# Patient Record
Sex: FEMALE | Race: BLACK OR AFRICAN AMERICAN | NOT HISPANIC OR LATINO | Employment: FULL TIME | RURAL
[De-identification: names, ages, dates, MRNs, and addresses within clinical notes are randomized per-mention and may not be internally consistent; named-entity substitution may affect disease eponyms.]

---

## 2018-01-29 ENCOUNTER — HISTORICAL (OUTPATIENT)
Dept: ADMINISTRATIVE | Facility: HOSPITAL | Age: 43
End: 2018-01-29

## 2018-02-01 LAB
LAB AP CLINICAL INFORMATION: NORMAL
LAB AP GENERAL CAT - HISTORICAL: NORMAL
LAB AP INTERPRETATION/RESULT - HISTORICAL: NEGATIVE
LAB AP SPECIMEN ADEQUACY - HISTORICAL: NORMAL
LAB AP SPECIMEN SUBMITTED - HISTORICAL: NORMAL

## 2020-02-27 ENCOUNTER — HISTORICAL (OUTPATIENT)
Dept: ADMINISTRATIVE | Facility: HOSPITAL | Age: 45
End: 2020-02-27

## 2021-03-12 VITALS
DIASTOLIC BLOOD PRESSURE: 93 MMHG | SYSTOLIC BLOOD PRESSURE: 138 MMHG | BODY MASS INDEX: 36.65 KG/M2 | RESPIRATION RATE: 20 BRPM | HEIGHT: 65 IN | TEMPERATURE: 98 F | WEIGHT: 220 LBS | HEART RATE: 86 BPM

## 2021-03-12 RX ORDER — AMLODIPINE BESYLATE 5 MG/1
5 TABLET ORAL DAILY
COMMUNITY
End: 2021-12-16 | Stop reason: SDUPTHER

## 2021-03-12 RX ORDER — VALACYCLOVIR HYDROCHLORIDE 500 MG/1
500 TABLET, FILM COATED ORAL 2 TIMES DAILY
COMMUNITY
End: 2021-12-20

## 2021-03-12 RX ORDER — ALBUTEROL SULFATE 90 UG/1
2 AEROSOL, METERED RESPIRATORY (INHALATION) EVERY 6 HOURS PRN
COMMUNITY
End: 2021-12-16 | Stop reason: SDUPTHER

## 2021-03-16 ENCOUNTER — HOSPITAL ENCOUNTER (OUTPATIENT)
Dept: RADIOLOGY | Facility: HOSPITAL | Age: 46
Discharge: HOME | End: 2021-03-16
Payer: COMMERCIAL

## 2021-03-16 VITALS — HEIGHT: 65 IN | WEIGHT: 220 LBS | BODY MASS INDEX: 36.65 KG/M2

## 2021-03-16 DIAGNOSIS — Z12.31 VISIT FOR SCREENING MAMMOGRAM: ICD-10-CM

## 2021-03-16 PROCEDURE — 77067 SCR MAMMO BI INCL CAD: CPT | Mod: TC

## 2021-03-17 ENCOUNTER — OFFICE VISIT (OUTPATIENT)
Dept: FAMILY MEDICINE | Facility: CLINIC | Age: 46
End: 2021-03-17
Payer: COMMERCIAL

## 2021-03-17 VITALS
DIASTOLIC BLOOD PRESSURE: 93 MMHG | BODY MASS INDEX: 36.29 KG/M2 | SYSTOLIC BLOOD PRESSURE: 142 MMHG | HEIGHT: 65 IN | TEMPERATURE: 98 F | WEIGHT: 217.81 LBS | OXYGEN SATURATION: 100 % | RESPIRATION RATE: 18 BRPM | HEART RATE: 65 BPM

## 2021-03-17 DIAGNOSIS — L02.91 ABSCESS: Primary | ICD-10-CM

## 2021-03-17 PROCEDURE — 99214 OFFICE O/P EST MOD 30 MIN: CPT | Mod: 25,,, | Performed by: FAMILY MEDICINE

## 2021-03-17 PROCEDURE — 96372 PR INJECTION,THERAP/PROPH/DIAG2ST, IM OR SUBCUT: ICD-10-PCS | Mod: ,,, | Performed by: FAMILY MEDICINE

## 2021-03-17 PROCEDURE — 99214 PR OFFICE/OUTPT VISIT, EST, LEVL IV, 30-39 MIN: ICD-10-PCS | Mod: 25,,, | Performed by: FAMILY MEDICINE

## 2021-03-17 PROCEDURE — 1126F AMNT PAIN NOTED NONE PRSNT: CPT | Mod: ,,, | Performed by: FAMILY MEDICINE

## 2021-03-17 PROCEDURE — 3008F BODY MASS INDEX DOCD: CPT | Mod: CPTII,,, | Performed by: FAMILY MEDICINE

## 2021-03-17 PROCEDURE — 3008F PR BODY MASS INDEX (BMI) DOCUMENTED: ICD-10-PCS | Mod: CPTII,,, | Performed by: FAMILY MEDICINE

## 2021-03-17 PROCEDURE — 1126F PR PAIN SEVERITY QUANTIFIED, NO PAIN PRESENT: ICD-10-PCS | Mod: ,,, | Performed by: FAMILY MEDICINE

## 2021-03-17 PROCEDURE — 96372 THER/PROPH/DIAG INJ SC/IM: CPT | Mod: ,,, | Performed by: FAMILY MEDICINE

## 2021-03-17 RX ORDER — CLINDAMYCIN PHOSPHATE 150 MG/ML
300 INJECTION, SOLUTION INTRAVENOUS
Status: COMPLETED | OUTPATIENT
Start: 2021-03-17 | End: 2021-03-17

## 2021-03-17 RX ORDER — FLUCONAZOLE 150 MG/1
150 TABLET ORAL WEEKLY
Qty: 2 TABLET | Refills: 0 | Status: SHIPPED | OUTPATIENT
Start: 2021-03-17 | End: 2021-03-24

## 2021-03-17 RX ORDER — CLINDAMYCIN HYDROCHLORIDE 300 MG/1
300 CAPSULE ORAL 3 TIMES DAILY
Qty: 21 CAPSULE | Refills: 0 | Status: SHIPPED | OUTPATIENT
Start: 2021-03-17 | End: 2021-03-24

## 2021-03-17 RX ADMIN — CLINDAMYCIN PHOSPHATE 300 MG: 150 INJECTION, SOLUTION INTRAVENOUS at 01:03

## 2021-03-19 ENCOUNTER — OFFICE VISIT (OUTPATIENT)
Dept: FAMILY MEDICINE | Facility: CLINIC | Age: 46
End: 2021-03-19
Payer: COMMERCIAL

## 2021-03-19 VITALS
DIASTOLIC BLOOD PRESSURE: 95 MMHG | HEIGHT: 65 IN | HEART RATE: 78 BPM | WEIGHT: 217 LBS | BODY MASS INDEX: 36.15 KG/M2 | OXYGEN SATURATION: 100 % | TEMPERATURE: 98 F | SYSTOLIC BLOOD PRESSURE: 148 MMHG | RESPIRATION RATE: 18 BRPM

## 2021-03-19 DIAGNOSIS — L02.91 ABSCESS: Primary | ICD-10-CM

## 2021-03-19 PROCEDURE — 3008F PR BODY MASS INDEX (BMI) DOCUMENTED: ICD-10-PCS | Mod: CPTII,,, | Performed by: FAMILY MEDICINE

## 2021-03-19 PROCEDURE — 3008F BODY MASS INDEX DOCD: CPT | Mod: CPTII,,, | Performed by: FAMILY MEDICINE

## 2021-03-19 PROCEDURE — 1125F AMNT PAIN NOTED PAIN PRSNT: CPT | Mod: ,,, | Performed by: FAMILY MEDICINE

## 2021-03-19 PROCEDURE — 1125F PR PAIN SEVERITY QUANTIFIED, PAIN PRESENT: ICD-10-PCS | Mod: ,,, | Performed by: FAMILY MEDICINE

## 2021-03-19 PROCEDURE — 99213 PR OFFICE/OUTPT VISIT, EST, LEVL III, 20-29 MIN: ICD-10-PCS | Mod: ,,, | Performed by: FAMILY MEDICINE

## 2021-03-19 PROCEDURE — 99213 OFFICE O/P EST LOW 20 MIN: CPT | Mod: ,,, | Performed by: FAMILY MEDICINE

## 2021-12-16 ENCOUNTER — OFFICE VISIT (OUTPATIENT)
Dept: FAMILY MEDICINE | Facility: CLINIC | Age: 46
End: 2021-12-16
Payer: COMMERCIAL

## 2021-12-16 VITALS
WEIGHT: 216.38 LBS | RESPIRATION RATE: 18 BRPM | BODY MASS INDEX: 36.05 KG/M2 | OXYGEN SATURATION: 100 % | SYSTOLIC BLOOD PRESSURE: 141 MMHG | HEIGHT: 65 IN | TEMPERATURE: 98 F | HEART RATE: 71 BPM | DIASTOLIC BLOOD PRESSURE: 96 MMHG

## 2021-12-16 DIAGNOSIS — Z13.29 SCREENING FOR THYROID DISORDER: ICD-10-CM

## 2021-12-16 DIAGNOSIS — Z00.00 ROUTINE GENERAL MEDICAL EXAMINATION AT A HEALTH CARE FACILITY: Primary | ICD-10-CM

## 2021-12-16 DIAGNOSIS — I10 HYPERTENSION, UNSPECIFIED TYPE: ICD-10-CM

## 2021-12-16 DIAGNOSIS — Z13.1 SCREENING FOR DIABETES MELLITUS: ICD-10-CM

## 2021-12-16 DIAGNOSIS — J45.909 ASTHMA, UNSPECIFIED ASTHMA SEVERITY, UNSPECIFIED WHETHER COMPLICATED, UNSPECIFIED WHETHER PERSISTENT: ICD-10-CM

## 2021-12-16 DIAGNOSIS — Z13.220 SCREENING FOR LIPID DISORDERS: ICD-10-CM

## 2021-12-16 LAB
ALBUMIN SERPL BCP-MCNC: 3.3 G/DL (ref 3.5–5)
ALBUMIN/GLOB SERPL: 0.8 {RATIO}
ALP SERPL-CCNC: 84 U/L (ref 39–100)
ALT SERPL W P-5'-P-CCNC: 40 U/L (ref 13–56)
ANION GAP SERPL CALCULATED.3IONS-SCNC: 9 MMOL/L (ref 7–16)
AST SERPL W P-5'-P-CCNC: 20 U/L (ref 15–37)
BASOPHILS # BLD AUTO: 0.02 K/UL (ref 0–0.2)
BASOPHILS NFR BLD AUTO: 0.3 % (ref 0–1)
BILIRUB SERPL-MCNC: 0.2 MG/DL (ref 0–1.2)
BUN SERPL-MCNC: 8 MG/DL (ref 7–18)
BUN/CREAT SERPL: 13 (ref 6–20)
CALCIUM SERPL-MCNC: 8.3 MG/DL (ref 8.5–10.1)
CHLORIDE SERPL-SCNC: 111 MMOL/L (ref 98–107)
CHOLEST SERPL-MCNC: 156 MG/DL (ref 0–200)
CHOLEST/HDLC SERPL: 2 {RATIO}
CO2 SERPL-SCNC: 26 MMOL/L (ref 21–32)
CREAT SERPL-MCNC: 0.62 MG/DL (ref 0.55–1.02)
DIFFERENTIAL METHOD BLD: ABNORMAL
EOSINOPHIL # BLD AUTO: 0.3 K/UL (ref 0–0.5)
EOSINOPHIL NFR BLD AUTO: 4.4 % (ref 1–4)
ERYTHROCYTE [DISTWIDTH] IN BLOOD BY AUTOMATED COUNT: 14.6 % (ref 11.5–14.5)
EST. AVERAGE GLUCOSE BLD GHB EST-MCNC: 90 MG/DL
GLOBULIN SER-MCNC: 4.1 G/DL (ref 2–4)
GLUCOSE SERPL-MCNC: 90 MG/DL (ref 74–106)
HBA1C MFR BLD HPLC: 5.3 % (ref 4.5–6.6)
HCT VFR BLD AUTO: 34.3 % (ref 38–47)
HDLC SERPL-MCNC: 77 MG/DL (ref 40–60)
HGB BLD-MCNC: 10.3 G/DL (ref 12–16)
IMM GRANULOCYTES # BLD AUTO: 0.02 K/UL (ref 0–0.04)
IMM GRANULOCYTES NFR BLD: 0.3 % (ref 0–0.4)
LDLC SERPL CALC-MCNC: 64 MG/DL
LDLC/HDLC SERPL: 0.8 {RATIO}
LYMPHOCYTES # BLD AUTO: 2.71 K/UL (ref 1–4.8)
LYMPHOCYTES NFR BLD AUTO: 39.7 % (ref 27–41)
MCH RBC QN AUTO: 25.4 PG (ref 27–31)
MCHC RBC AUTO-ENTMCNC: 30 G/DL (ref 32–36)
MCV RBC AUTO: 84.5 FL (ref 80–96)
MONOCYTES # BLD AUTO: 0.71 K/UL (ref 0–0.8)
MONOCYTES NFR BLD AUTO: 10.4 % (ref 2–6)
MPC BLD CALC-MCNC: 10.2 FL (ref 9.4–12.4)
NEUTROPHILS # BLD AUTO: 3.07 K/UL (ref 1.8–7.7)
NEUTROPHILS NFR BLD AUTO: 44.9 % (ref 53–65)
NONHDLC SERPL-MCNC: 79 MG/DL
NRBC # BLD AUTO: 0 X10E3/UL
NRBC, AUTO (.00): 0 %
PLATELET # BLD AUTO: 386 K/UL (ref 150–400)
POTASSIUM SERPL-SCNC: 4 MMOL/L (ref 3.5–5.1)
PROT SERPL-MCNC: 7.4 G/DL (ref 6.4–8.2)
RBC # BLD AUTO: 4.06 M/UL (ref 4.2–5.4)
SODIUM SERPL-SCNC: 142 MMOL/L (ref 136–145)
TRIGL SERPL-MCNC: 75 MG/DL (ref 35–150)
TSH SERPL DL<=0.005 MIU/L-ACNC: 2.07 UIU/ML (ref 0.36–3.74)
VLDLC SERPL-MCNC: 15 MG/DL
WBC # BLD AUTO: 6.83 K/UL (ref 4.5–11)

## 2021-12-16 PROCEDURE — 83036 HEMOGLOBIN GLYCOSYLATED A1C: CPT | Mod: ,,, | Performed by: CLINICAL MEDICAL LABORATORY

## 2021-12-16 PROCEDURE — 80053 COMPREHEN METABOLIC PANEL: CPT | Mod: ,,, | Performed by: CLINICAL MEDICAL LABORATORY

## 2021-12-16 PROCEDURE — 83036 HEMOGLOBIN A1C: ICD-10-PCS | Mod: ,,, | Performed by: CLINICAL MEDICAL LABORATORY

## 2021-12-16 PROCEDURE — 85025 COMPLETE CBC W/AUTO DIFF WBC: CPT | Mod: ,,, | Performed by: CLINICAL MEDICAL LABORATORY

## 2021-12-16 PROCEDURE — 80053 COMPREHENSIVE METABOLIC PANEL: ICD-10-PCS | Mod: ,,, | Performed by: CLINICAL MEDICAL LABORATORY

## 2021-12-16 PROCEDURE — 85025 CBC WITH DIFFERENTIAL: ICD-10-PCS | Mod: ,,, | Performed by: CLINICAL MEDICAL LABORATORY

## 2021-12-16 PROCEDURE — 84443 ASSAY THYROID STIM HORMONE: CPT | Mod: ,,, | Performed by: CLINICAL MEDICAL LABORATORY

## 2021-12-16 PROCEDURE — 84443 TSH: ICD-10-PCS | Mod: ,,, | Performed by: CLINICAL MEDICAL LABORATORY

## 2021-12-16 PROCEDURE — 99396 PREV VISIT EST AGE 40-64: CPT | Mod: ,,, | Performed by: NURSE PRACTITIONER

## 2021-12-16 PROCEDURE — 80061 LIPID PANEL: ICD-10-PCS | Mod: ,,, | Performed by: CLINICAL MEDICAL LABORATORY

## 2021-12-16 PROCEDURE — 80061 LIPID PANEL: CPT | Mod: ,,, | Performed by: CLINICAL MEDICAL LABORATORY

## 2021-12-16 PROCEDURE — 99396 PR PREVENTIVE VISIT,EST,40-64: ICD-10-PCS | Mod: ,,, | Performed by: NURSE PRACTITIONER

## 2021-12-16 RX ORDER — NAPROXEN 500 MG/1
500 TABLET ORAL 2 TIMES DAILY PRN
Qty: 30 TABLET | Refills: 0 | Status: SHIPPED | OUTPATIENT
Start: 2021-12-16 | End: 2023-08-08

## 2021-12-16 RX ORDER — AMLODIPINE BESYLATE 5 MG/1
5 TABLET ORAL DAILY
Qty: 90 TABLET | Refills: 3 | Status: SHIPPED | OUTPATIENT
Start: 2021-12-16 | End: 2023-01-24 | Stop reason: SDUPTHER

## 2021-12-16 RX ORDER — ALBUTEROL SULFATE 90 UG/1
2 AEROSOL, METERED RESPIRATORY (INHALATION) EVERY 6 HOURS PRN
Qty: 18 G | Refills: 2 | Status: SHIPPED | OUTPATIENT
Start: 2021-12-16 | End: 2023-01-24 | Stop reason: SDUPTHER

## 2021-12-20 ENCOUNTER — OFFICE VISIT (OUTPATIENT)
Dept: OBSTETRICS AND GYNECOLOGY | Facility: CLINIC | Age: 46
End: 2021-12-20
Payer: COMMERCIAL

## 2021-12-20 VITALS
TEMPERATURE: 97 F | HEART RATE: 74 BPM | BODY MASS INDEX: 36.35 KG/M2 | WEIGHT: 218.19 LBS | SYSTOLIC BLOOD PRESSURE: 138 MMHG | OXYGEN SATURATION: 98 % | RESPIRATION RATE: 17 BRPM | HEIGHT: 65 IN | DIASTOLIC BLOOD PRESSURE: 92 MMHG

## 2021-12-20 DIAGNOSIS — A60.00 GENITAL HERPES SIMPLEX, UNSPECIFIED SITE: Primary | ICD-10-CM

## 2021-12-20 DIAGNOSIS — Z72.51 HIGH RISK HETEROSEXUAL BEHAVIOR: ICD-10-CM

## 2021-12-20 LAB
CANDIDA SPECIES: NEGATIVE
GARDNERELLA: POSITIVE
TRICHOMONAS: NEGATIVE

## 2021-12-20 PROCEDURE — 99203 OFFICE O/P NEW LOW 30 MIN: CPT | Mod: ,,, | Performed by: ADVANCED PRACTICE MIDWIFE

## 2021-12-20 PROCEDURE — 99203 PR OFFICE/OUTPT VISIT, NEW, LEVL III, 30-44 MIN: ICD-10-PCS | Mod: ,,, | Performed by: ADVANCED PRACTICE MIDWIFE

## 2021-12-20 PROCEDURE — 87480 CANDIDA DNA DIR PROBE: CPT | Mod: ,,, | Performed by: CLINICAL MEDICAL LABORATORY

## 2021-12-20 PROCEDURE — 87491 CHLAMYDIA/GONORRHOEAE(GC), PCR: ICD-10-PCS | Mod: ,,, | Performed by: CLINICAL MEDICAL LABORATORY

## 2021-12-20 PROCEDURE — 87591 N.GONORRHOEAE DNA AMP PROB: CPT | Mod: ,,, | Performed by: CLINICAL MEDICAL LABORATORY

## 2021-12-20 PROCEDURE — 87491 CHLMYD TRACH DNA AMP PROBE: CPT | Mod: ,,, | Performed by: CLINICAL MEDICAL LABORATORY

## 2021-12-20 PROCEDURE — 87510 BACTERIAL VAGINOSIS: ICD-10-PCS | Mod: ,,, | Performed by: CLINICAL MEDICAL LABORATORY

## 2021-12-20 PROCEDURE — 87591 CHLAMYDIA/GONORRHOEAE(GC), PCR: ICD-10-PCS | Mod: ,,, | Performed by: CLINICAL MEDICAL LABORATORY

## 2021-12-20 PROCEDURE — 87480 BACTERIAL VAGINOSIS: ICD-10-PCS | Mod: ,,, | Performed by: CLINICAL MEDICAL LABORATORY

## 2021-12-20 PROCEDURE — 87660 TRICHOMONAS VAGIN DIR PROBE: CPT | Mod: ,,, | Performed by: CLINICAL MEDICAL LABORATORY

## 2021-12-20 PROCEDURE — 87660 BACTERIAL VAGINOSIS: ICD-10-PCS | Mod: ,,, | Performed by: CLINICAL MEDICAL LABORATORY

## 2021-12-20 PROCEDURE — 87510 GARDNER VAG DNA DIR PROBE: CPT | Mod: ,,, | Performed by: CLINICAL MEDICAL LABORATORY

## 2021-12-20 RX ORDER — VALACYCLOVIR HYDROCHLORIDE 500 MG/1
500 TABLET, FILM COATED ORAL DAILY
Qty: 30 TABLET | Refills: 11 | Status: SHIPPED | OUTPATIENT
Start: 2021-12-20 | End: 2023-08-08

## 2021-12-21 ENCOUNTER — TELEPHONE (OUTPATIENT)
Dept: OBSTETRICS AND GYNECOLOGY | Facility: CLINIC | Age: 46
End: 2021-12-21
Payer: COMMERCIAL

## 2021-12-21 DIAGNOSIS — N76.0 BACTERIAL VAGINAL INFECTION: Primary | ICD-10-CM

## 2021-12-21 DIAGNOSIS — B96.89 BACTERIAL VAGINAL INFECTION: Primary | ICD-10-CM

## 2021-12-21 LAB
CHLAMYDIA BY PCR: NEGATIVE
N. GONORRHOEAE (GC) BY PCR: NEGATIVE

## 2021-12-21 RX ORDER — METRONIDAZOLE 500 MG/1
500 TABLET ORAL 2 TIMES DAILY
Qty: 14 TABLET | Refills: 0 | Status: SHIPPED | OUTPATIENT
Start: 2021-12-21 | End: 2021-12-28

## 2023-01-24 ENCOUNTER — OFFICE VISIT (OUTPATIENT)
Dept: FAMILY MEDICINE | Facility: CLINIC | Age: 48
End: 2023-01-24
Payer: COMMERCIAL

## 2023-01-24 VITALS
DIASTOLIC BLOOD PRESSURE: 74 MMHG | HEART RATE: 71 BPM | TEMPERATURE: 98 F | SYSTOLIC BLOOD PRESSURE: 119 MMHG | WEIGHT: 216 LBS | BODY MASS INDEX: 34.72 KG/M2 | OXYGEN SATURATION: 99 % | HEIGHT: 66 IN | RESPIRATION RATE: 18 BRPM

## 2023-01-24 DIAGNOSIS — Z12.4 SCREENING FOR CERVICAL CANCER: ICD-10-CM

## 2023-01-24 DIAGNOSIS — Z13.220 SCREENING FOR LIPID DISORDERS: ICD-10-CM

## 2023-01-24 DIAGNOSIS — I10 HYPERTENSION, UNSPECIFIED TYPE: ICD-10-CM

## 2023-01-24 DIAGNOSIS — Z13.1 SCREENING FOR DIABETES MELLITUS: ICD-10-CM

## 2023-01-24 DIAGNOSIS — Z12.39 ENCOUNTER FOR SCREENING FOR MALIGNANT NEOPLASM OF BREAST, UNSPECIFIED SCREENING MODALITY: ICD-10-CM

## 2023-01-24 DIAGNOSIS — Z00.00 ROUTINE GENERAL MEDICAL EXAMINATION AT A HEALTH CARE FACILITY: Primary | ICD-10-CM

## 2023-01-24 DIAGNOSIS — Z12.11 SCREENING FOR COLON CANCER: ICD-10-CM

## 2023-01-24 DIAGNOSIS — N92.1 MENORRHAGIA WITH IRREGULAR CYCLE: ICD-10-CM

## 2023-01-24 DIAGNOSIS — Z11.59 NEED FOR HEPATITIS C SCREENING TEST: ICD-10-CM

## 2023-01-24 DIAGNOSIS — J45.909 ASTHMA, UNSPECIFIED ASTHMA SEVERITY, UNSPECIFIED WHETHER COMPLICATED, UNSPECIFIED WHETHER PERSISTENT: ICD-10-CM

## 2023-01-24 DIAGNOSIS — Z11.4 SCREENING FOR HIV (HUMAN IMMUNODEFICIENCY VIRUS): ICD-10-CM

## 2023-01-24 LAB
ALBUMIN SERPL BCP-MCNC: 3.6 G/DL (ref 3.5–5)
ALBUMIN/GLOB SERPL: 0.9 {RATIO}
ALP SERPL-CCNC: 84 U/L (ref 39–100)
ALT SERPL W P-5'-P-CCNC: 20 U/L (ref 13–56)
ANION GAP SERPL CALCULATED.3IONS-SCNC: 7 MMOL/L (ref 7–16)
ANISOCYTOSIS BLD QL SMEAR: ABNORMAL
AST SERPL W P-5'-P-CCNC: 10 U/L (ref 15–37)
BASOPHILS # BLD AUTO: 0.03 K/UL (ref 0–0.2)
BASOPHILS NFR BLD AUTO: 0.3 % (ref 0–1)
BILIRUB SERPL-MCNC: 0.2 MG/DL (ref ?–1.2)
BILIRUB SERPL-MCNC: NORMAL MG/DL
BLOOD URINE, POC: NORMAL
BUN SERPL-MCNC: 9 MG/DL (ref 7–18)
BUN/CREAT SERPL: 13 (ref 6–20)
CALCIUM SERPL-MCNC: 9.1 MG/DL (ref 8.5–10.1)
CANDIDA SPECIES: NEGATIVE
CHLORIDE SERPL-SCNC: 109 MMOL/L (ref 98–107)
CHOLEST SERPL-MCNC: 154 MG/DL (ref 0–200)
CHOLEST/HDLC SERPL: 1.8 {RATIO}
CO2 SERPL-SCNC: 27 MMOL/L (ref 21–32)
COLOR, POC UA: YELLOW
CREAT SERPL-MCNC: 0.67 MG/DL (ref 0.55–1.02)
DIFFERENTIAL METHOD BLD: ABNORMAL
EGFR (NO RACE VARIABLE) (RUSH/TITUS): 109 ML/MIN/1.73M²
EOSINOPHIL # BLD AUTO: 0.15 K/UL (ref 0–0.5)
EOSINOPHIL NFR BLD AUTO: 1.5 % (ref 1–4)
ERYTHROCYTE [DISTWIDTH] IN BLOOD BY AUTOMATED COUNT: 17.1 % (ref 11.5–14.5)
GARDNERELLA: POSITIVE
GLOBULIN SER-MCNC: 4.2 G/DL (ref 2–4)
GLUCOSE SERPL-MCNC: 77 MG/DL (ref 74–106)
GLUCOSE UR QL STRIP: NORMAL
HCT VFR BLD AUTO: 29.4 % (ref 38–47)
HCV AB SER QL: NORMAL
HDLC SERPL-MCNC: 84 MG/DL (ref 40–60)
HGB BLD-MCNC: 8.7 G/DL (ref 12–16)
HIV 1+O+2 AB SERPL QL: NORMAL
IMM GRANULOCYTES # BLD AUTO: 0.03 K/UL (ref 0–0.04)
IMM GRANULOCYTES NFR BLD: 0.3 % (ref 0–0.4)
KETONES UR QL STRIP: NORMAL
LDLC SERPL CALC-MCNC: 55 MG/DL
LEUKOCYTE ESTERASE URINE, POC: NORMAL
LYMPHOCYTES # BLD AUTO: 3.59 K/UL (ref 1–4.8)
LYMPHOCYTES NFR BLD AUTO: 34.9 % (ref 27–41)
MCH RBC QN AUTO: 21.7 PG (ref 27–31)
MCHC RBC AUTO-ENTMCNC: 29.6 G/DL (ref 32–36)
MCV RBC AUTO: 73.3 FL (ref 80–96)
MICROCYTES BLD QL SMEAR: ABNORMAL
MONOCYTES # BLD AUTO: 0.97 K/UL (ref 0–0.8)
MONOCYTES NFR BLD AUTO: 9.4 % (ref 2–6)
MPC BLD CALC-MCNC: 9.2 FL (ref 9.4–12.4)
NEUTROPHILS # BLD AUTO: 5.52 K/UL (ref 1.8–7.7)
NEUTROPHILS NFR BLD AUTO: 53.6 % (ref 53–65)
NITRITE, POC UA: NORMAL
NONHDLC SERPL-MCNC: 70 MG/DL
NRBC # BLD AUTO: 0 X10E3/UL
NRBC, AUTO (.00): 0 %
PH, POC UA: 6
PLATELET # BLD AUTO: 454 K/UL (ref 150–400)
PLATELET MORPHOLOGY: ABNORMAL
POLYCHROMASIA BLD QL SMEAR: ABNORMAL
POTASSIUM SERPL-SCNC: 3.3 MMOL/L (ref 3.5–5.1)
PROT SERPL-MCNC: 7.8 G/DL (ref 6.4–8.2)
PROTEIN, POC: NORMAL
RBC # BLD AUTO: 4.01 M/UL (ref 4.2–5.4)
SODIUM SERPL-SCNC: 140 MMOL/L (ref 136–145)
SPECIFIC GRAVITY, POC UA: >1.03
TRICHOMONAS: NEGATIVE
TRIGL SERPL-MCNC: 76 MG/DL (ref 35–150)
UROBILINOGEN, POC UA: 0.2
VLDLC SERPL-MCNC: 15 MG/DL
WBC # BLD AUTO: 10.29 K/UL (ref 4.5–11)

## 2023-01-24 PROCEDURE — 99396 PR PREVENTIVE VISIT,EST,40-64: ICD-10-PCS | Mod: ,,, | Performed by: NURSE PRACTITIONER

## 2023-01-24 PROCEDURE — 80053 COMPREHEN METABOLIC PANEL: CPT | Mod: ,,, | Performed by: CLINICAL MEDICAL LABORATORY

## 2023-01-24 PROCEDURE — G0101 PR CA SCREEN;PELVIC/BREAST EXAM: ICD-10-PCS | Mod: ,,, | Performed by: NURSE PRACTITIONER

## 2023-01-24 PROCEDURE — 87591 N.GONORRHOEAE DNA AMP PROB: CPT | Mod: ,,, | Performed by: CLINICAL MEDICAL LABORATORY

## 2023-01-24 PROCEDURE — 80061 LIPID PANEL: ICD-10-PCS | Mod: ,,, | Performed by: CLINICAL MEDICAL LABORATORY

## 2023-01-24 PROCEDURE — G0444 PR DEPRESSION SCREENING: ICD-10-PCS | Mod: ,,, | Performed by: NURSE PRACTITIONER

## 2023-01-24 PROCEDURE — 87510 GARDNER VAG DNA DIR PROBE: CPT | Mod: ,,, | Performed by: CLINICAL MEDICAL LABORATORY

## 2023-01-24 PROCEDURE — 87480 BACTERIAL VAGINOSIS: ICD-10-PCS | Mod: ,,, | Performed by: CLINICAL MEDICAL LABORATORY

## 2023-01-24 PROCEDURE — 87389 HIV-1 AG W/HIV-1&-2 AB AG IA: CPT | Mod: ,,, | Performed by: CLINICAL MEDICAL LABORATORY

## 2023-01-24 PROCEDURE — G0101 CA SCREEN;PELVIC/BREAST EXAM: HCPCS | Mod: ,,, | Performed by: NURSE PRACTITIONER

## 2023-01-24 PROCEDURE — 85025 COMPLETE CBC W/AUTO DIFF WBC: CPT | Mod: ,,, | Performed by: CLINICAL MEDICAL LABORATORY

## 2023-01-24 PROCEDURE — 87491 CHLAMYDIA/GONORRHOEAE(GC), PCR: ICD-10-PCS | Mod: ,,, | Performed by: CLINICAL MEDICAL LABORATORY

## 2023-01-24 PROCEDURE — 85025 CBC WITH DIFFERENTIAL: ICD-10-PCS | Mod: ,,, | Performed by: CLINICAL MEDICAL LABORATORY

## 2023-01-24 PROCEDURE — 86803 HEPATITIS C AB TEST: CPT | Mod: ,,, | Performed by: CLINICAL MEDICAL LABORATORY

## 2023-01-24 PROCEDURE — 81003 URINALYSIS AUTO W/O SCOPE: CPT | Mod: QW,,, | Performed by: NURSE PRACTITIONER

## 2023-01-24 PROCEDURE — 99173 PR VISUAL SCREENING TEST, BILAT: ICD-10-PCS | Mod: ,,, | Performed by: NURSE PRACTITIONER

## 2023-01-24 PROCEDURE — 88142 CYTOPATH C/V THIN LAYER: CPT | Mod: TC,GCY | Performed by: NURSE PRACTITIONER

## 2023-01-24 PROCEDURE — G0444 DEPRESSION SCREEN ANNUAL: HCPCS | Mod: ,,, | Performed by: NURSE PRACTITIONER

## 2023-01-24 PROCEDURE — 81003 POCT URINALYSIS W/O SCOPE: ICD-10-PCS | Mod: QW,,, | Performed by: NURSE PRACTITIONER

## 2023-01-24 PROCEDURE — 80061 LIPID PANEL: CPT | Mod: ,,, | Performed by: CLINICAL MEDICAL LABORATORY

## 2023-01-24 PROCEDURE — 86803 HEPATITIS C ANTIBODY: ICD-10-PCS | Mod: ,,, | Performed by: CLINICAL MEDICAL LABORATORY

## 2023-01-24 PROCEDURE — 87491 CHLMYD TRACH DNA AMP PROBE: CPT | Mod: ,,, | Performed by: CLINICAL MEDICAL LABORATORY

## 2023-01-24 PROCEDURE — 87591 CHLAMYDIA/GONORRHOEAE(GC), PCR: ICD-10-PCS | Mod: ,,, | Performed by: CLINICAL MEDICAL LABORATORY

## 2023-01-24 PROCEDURE — 99396 PREV VISIT EST AGE 40-64: CPT | Mod: ,,, | Performed by: NURSE PRACTITIONER

## 2023-01-24 PROCEDURE — 87480 CANDIDA DNA DIR PROBE: CPT | Mod: ,,, | Performed by: CLINICAL MEDICAL LABORATORY

## 2023-01-24 PROCEDURE — 87389 HIV 1 / 2 ANTIBODY: ICD-10-PCS | Mod: ,,, | Performed by: CLINICAL MEDICAL LABORATORY

## 2023-01-24 PROCEDURE — 80053 COMPREHENSIVE METABOLIC PANEL: ICD-10-PCS | Mod: ,,, | Performed by: CLINICAL MEDICAL LABORATORY

## 2023-01-24 PROCEDURE — 99173 VISUAL ACUITY SCREEN: CPT | Mod: ,,, | Performed by: NURSE PRACTITIONER

## 2023-01-24 PROCEDURE — 87660 BACTERIAL VAGINOSIS: ICD-10-PCS | Mod: ,,, | Performed by: CLINICAL MEDICAL LABORATORY

## 2023-01-24 PROCEDURE — 87510 BACTERIAL VAGINOSIS: ICD-10-PCS | Mod: ,,, | Performed by: CLINICAL MEDICAL LABORATORY

## 2023-01-24 PROCEDURE — 87660 TRICHOMONAS VAGIN DIR PROBE: CPT | Mod: ,,, | Performed by: CLINICAL MEDICAL LABORATORY

## 2023-01-24 RX ORDER — AMLODIPINE BESYLATE 5 MG/1
5 TABLET ORAL DAILY
Qty: 90 TABLET | Refills: 3 | Status: SHIPPED | OUTPATIENT
Start: 2023-01-24 | End: 2024-03-04 | Stop reason: SDUPTHER

## 2023-01-24 RX ORDER — ALBUTEROL SULFATE 90 UG/1
2 AEROSOL, METERED RESPIRATORY (INHALATION) EVERY 6 HOURS PRN
Qty: 18 G | Refills: 2 | Status: SHIPPED | OUTPATIENT
Start: 2023-01-24

## 2023-01-24 NOTE — PROGRESS NOTES
DARWIN Carvajal   [facility]      PATIENT NAME: Madelaine Palacio  : 1975  DATE: 23  MRN: [id number]      Patient PCP Information       Provider PCP Type    [facility]          Chief Complaint      Chief Complaint   Patient presents with    wellness     Room 8/ wellness with a pap       History of Present Illness        Madelaine Palacio is a 47 y.o. female who presents for routine wellness visit. Pt states she is doing well with no acute complaints. Pt denies  FHx of breast, colon, or cervical cancer. Last colonoscopy was never with referral placed for screening colonoscopy. Last mammogram was 3/16/2021, did not get mammogram last year when ordered. Last pap was . Declines flu shot. Former smoker. Working on improving diet and exercise.    Past Medical History:  Past Medical History:   Diagnosis Date    Asthma     Hypertension        Past Surgical History:   has a past surgical history that includes right wrist skin graft and left wrist fracture.    Social History:  Social History     Tobacco Use    Smoking status: Former    Smokeless tobacco: Never   Substance Use Topics    Alcohol use: Yes     Comment: monthly     Drug use: Not Currently       I personally reviewed all past medical, surgical, and social.     Review of Systems   Constitutional:  Negative for activity change, appetite change, chills, diaphoresis, fatigue, fever and unexpected weight change.   HENT:  Negative for congestion, ear pain, facial swelling, hearing loss, nosebleeds and sore throat.    Eyes: Negative.    Respiratory:  Negative for apnea, cough, shortness of breath and wheezing.    Cardiovascular:  Negative for chest pain, palpitations and leg swelling.   Gastrointestinal:  Negative for abdominal distention, abdominal pain, blood in stool, constipation, diarrhea and nausea.   Endocrine: Negative for cold intolerance, heat intolerance, polydipsia, polyphagia and polyuria.   Genitourinary:  Negative  for decreased urine volume, difficulty urinating, dysuria, flank pain, frequency, hematuria and urgency.   Musculoskeletal:  Negative for arthralgias, joint swelling and myalgias.   Skin:  Negative for color change and rash.   Allergic/Immunologic: Negative.    Neurological:  Negative for dizziness, tremors, seizures, syncope, facial asymmetry, speech difficulty, weakness, light-headedness, numbness and headaches.   Hematological:  Negative for adenopathy. Does not bruise/bleed easily.   Psychiatric/Behavioral:  Negative for behavioral problems and confusion.       Medications:  Outpatient Encounter Medications as of 1/24/2023   Medication Sig Note Dispense Refill    naproxen (NAPROSYN) 500 MG tablet Take 1 tablet (500 mg total) by mouth 2 (two) times daily as needed (wrist pain, take with meals).  30 tablet 0    [DISCONTINUED] albuterol (PROVENTIL/VENTOLIN HFA) 90 mcg/actuation inhaler Inhale 2 puffs into the lungs every 6 (six) hours as needed for Wheezing. Rescue  18 g 2    [DISCONTINUED] amLODIPine (NORVASC) 5 MG tablet Take 1 tablet (5 mg total) by mouth once daily. 1/24/2023: Taking 90 tablet 3    albuterol (PROVENTIL/VENTOLIN HFA) 90 mcg/actuation inhaler Inhale 2 puffs into the lungs every 6 (six) hours as needed for Wheezing. Rescue  18 g 2    amLODIPine (NORVASC) 5 MG tablet Take 1 tablet (5 mg total) by mouth once daily.  90 tablet 3    valACYclovir (VALTREX) 500 MG tablet Take 1 tablet (500 mg total) by mouth once daily.  30 tablet 11     No facility-administered encounter medications on file as of 1/24/2023.       Allergies:  Review of patient's allergies indicates:  No Known Allergies    Health Maintenance:  Immunization History   Administered Date(s) Administered    COVID-19, MRNA, LN-S, PF (Pfizer) ([place]) 07/29/2021, 08/19/2021      Health Maintenance   Topic Date Due    Hepatitis C Screening  Never done    Mammogram  03/16/2022    TETANUS VACCINE  01/24/2024 (Originally 2/23/1994)    Lipid Panel  " 12/16/2026        Physical Exam     Vitals:    01/24/23 1440   BP: 119/74   BP Location: Right arm   Patient Position: Sitting   BP Method: Medium (Automatic)   Pulse: 71   Resp: 18   Temp: 98.1 °F (36.7 °C)   SpO2: 99%   Weight: 98 kg (216 lb)   Height: 5' 5.5" (1.664 m)     Vision Screening    Right eye Left eye Both eyes   Without correction 20/13 20/15 20/13   With correction        Over the last two weeks how often have you been bothered by little interest or pleasure in doing things: 0  Over the last two weeks how often have you been bothered by feeling down, depressed or hopeless: 0  PHQ-2 Total Score: 0      Physical Exam  Vitals and nursing note reviewed. Exam conducted with a chaperone present.   Constitutional:       Appearance: Normal appearance. She is obese.   HENT:      Head: Normocephalic.      Right Ear: Tympanic membrane, ear canal and external ear normal.      Left Ear: Tympanic membrane, ear canal and external ear normal.      Nose: Nose normal.      Mouth/Throat:      Mouth: Mucous membranes are moist.   Eyes:      Extraocular Movements: Extraocular movements intact.      Conjunctiva/sclera: Conjunctivae normal.      Pupils: Pupils are equal, round, and reactive to light.   Cardiovascular:      Rate and Rhythm: Normal rate and regular rhythm.      Pulses: Normal pulses.      Heart sounds: Normal heart sounds. No murmur heard.  Pulmonary:      Effort: Pulmonary effort is normal.      Breath sounds: No stridor. Wheezing present. No rhonchi.   Chest:      Chest wall: No mass, lacerations, deformity, swelling, tenderness, crepitus or edema. There is no dullness to percussion.   Breasts:     Christo Score is 5.      Breasts are symmetrical.      Right: Normal.      Left: Normal.      Comments: Dense breast tissue  Abdominal:      General: Bowel sounds are normal. There is no distension.      Palpations: Abdomen is soft. There is no mass.      Tenderness: There is no abdominal tenderness.      Hernia: " There is no hernia in the left inguinal area or right inguinal area.   Genitourinary:     Exam position: Lithotomy position.      Pubic Area: No rash or pubic lice.       Christo stage (genital): 5.      Labia:         Right: No rash, tenderness, lesion or injury.         Left: No rash, tenderness, lesion or injury.       Urethra: No prolapse, urethral pain, urethral swelling or urethral lesion.      Vagina: Normal.      Cervix: Discharge present.      Uterus: Normal.       Adnexa: Right adnexa normal and left adnexa normal.      Rectum: External hemorrhoid present.      Comments: Cervix tilted down. Unable to visualize os. White discharge  No CMT on manual pelvic  Medium speculum to perform pap  Musculoskeletal:         General: No swelling or tenderness. Normal range of motion.      Cervical back: Normal range of motion and neck supple.      Right lower leg: No edema.      Left lower leg: No edema.   Lymphadenopathy:      Lower Body: No right inguinal adenopathy. No left inguinal adenopathy.   Skin:     General: Skin is warm and dry.      Capillary Refill: Capillary refill takes less than 2 seconds.   Neurological:      General: No focal deficit present.      Mental Status: She is alert and oriented to person, place, and time. Mental status is at baseline.      Cranial Nerves: No cranial nerve deficit.      Sensory: No sensory deficit.      Motor: No weakness.   Psychiatric:         Mood and Affect: Mood normal.         Behavior: Behavior normal.         Thought Content: Thought content normal.         Judgment: Judgment normal.            Is patient >64 yo of age?  No flowsheet data found.      Laboratory:  CBC:  Recent Labs   Lab 12/16/21  1121   WBC 6.83   RBC 4.06 L   Hemoglobin 10.3 L   Hematocrit 34.3 L   Platelet Count 386   MCV 84.5   MCH 25.4 L   MCHC 30.0 L     CMP:  Recent Labs   Lab 12/16/21  1121   Glucose 90   Calcium 8.3 L   Albumin 3.3 L   Total Protein 7.4   Sodium 142   Potassium 4.0   CO2 26    Chloride 111 H   BUN 8   Alk Phos 84   ALT 40   AST 20   Bilirubin, Total 0.2     LIPIDS:  Recent Labs   Lab 12/16/21  1121   TSH 2.070   HDL Cholesterol 77 H   Cholesterol 156   Triglycerides 75   LDL Calculated 64   Cholesterol/HDL Ratio (Risk Factor) 2.0   Non-HDL 79     TSH:  Recent Labs   Lab 12/16/21  1121   TSH 2.070     A1C:  Recent Labs   Lab 12/16/21  1121   Hemoglobin A1C 5.3       Assessment/Plan     Madelaine Palacio is a 47 y.o.female with:     1. Routine general medical examination at a health care facility  - Ambulatory referral/consult to Gastroenterology; Future  - Mammo Digital Screening Bilat; Future  - Comprehensive Metabolic Panel; Future  - CBC Auto Differential; Future  - Lipid Panel; Future  - HIV 1/2 Ag/Ab (4th Gen); Future  - Hepatitis C Antibody; Future  - ThinPrep Pap Test; Future  - Bacterial Vaginosis; Future  - POCT URINALYSIS W/O SCOPE      2. BMI 35.0-35.9,adult  -healthy diet, exercise and weight loss recommended    3. Encounter for screening for malignant neoplasm of breast, unspecified screening modality  - Mammo Digital Screening Bilat; Future    4. Screening for cervical cancer  - ThinPrep Pap Test; Future  - Bacterial Vaginosis; Future  - Chlamydia/GC, PCR; Future      5. Screening for lipid disorders  - Lipid Panel; Future    6. Screening for diabetes mellitus  - Comprehensive Metabolic Panel; Future    7. Screening for colon cancer  - Ambulatory referral/consult to Gastroenterology; Future    8. Need for hepatitis C screening test  - Hepatitis C Antibody; Future    9. Screening for HIV (human immunodeficiency virus)  - HIV 1/2 Ag/Ab (4th Gen); Future    10. Menorrhagia with irregular cycle  - US Pelvis Complete Non OB; Future    11. Hypertension, unspecified type  - amLODIPine (NORVASC) 5 MG tablet; Take 1 tablet (5 mg total) by mouth once daily.  Dispense: 90 tablet; Refill: 3  -DASH diet and exercise     12. Asthma, unspecified asthma severity, unspecified whether  complicated, unspecified whether persistent  - albuterol (PROVENTIL/VENTOLIN HFA) 90 mcg/actuation inhaler; Inhale 2 puffs into the lungs every 6 (six) hours as needed for Wheezing. Rescue  Dispense: 18 g; Refill: 2       Total time spent face-to-face and non-face-to-face coordinating care for this encounter was: >30 min    Chronic conditions status updated as per HPI.  Other than changes above, cont current medications and maintain follow up with specialists.  Return to clinic 1 year next wellness, 6 mo htn follow up.    Jazmín Romero [facility]

## 2023-01-25 DIAGNOSIS — Z12.11 SCREEN FOR COLON CANCER: Primary | ICD-10-CM

## 2023-01-25 LAB
CHLAMYDIA BY PCR: NEGATIVE
N. GONORRHOEAE (GC) BY PCR: NEGATIVE

## 2023-01-26 LAB
GH SERPL-MCNC: NORMAL NG/ML
INSULIN SERPL-ACNC: NORMAL U[IU]/ML
LAB AP CLINICAL INFORMATION: NORMAL
LAB AP GYN INTERPRETATION: NEGATIVE
LAB AP PAP DISCLAIMER COMMENTS: NORMAL
RENIN PLAS-CCNC: NORMAL NG/ML/H

## 2023-01-30 RX ORDER — METRONIDAZOLE 500 MG/1
500 TABLET ORAL EVERY 12 HOURS
Qty: 14 TABLET | Refills: 0 | Status: SHIPPED | OUTPATIENT
Start: 2023-01-30 | End: 2023-02-06

## 2023-01-30 RX ORDER — FERROUS SULFATE 324(65)MG
324 TABLET, DELAYED RELEASE (ENTERIC COATED) ORAL 2 TIMES DAILY
Qty: 30 TABLET | Refills: 0 | Status: SHIPPED | OUTPATIENT
Start: 2023-01-30 | End: 2023-08-08 | Stop reason: SDUPTHER

## 2023-02-17 ENCOUNTER — TELEPHONE (OUTPATIENT)
Dept: FAMILY MEDICINE | Facility: CLINIC | Age: 48
End: 2023-02-17
Payer: COMMERCIAL

## 2023-02-17 NOTE — TELEPHONE ENCOUNTER
Attempted to call pt to notify of Mammogram rescheduled date/time for 03/27/2023 @ 2:45 PM Unable to reach.

## 2023-02-17 NOTE — TELEPHONE ENCOUNTER
----- Message from Roge Mcknight sent at 2/16/2023  3:38 PM CST -----  Regarding: Patient missed the appt with the imaging center, and is needing to get it rescheduled.  Patient missed the Mammogram appt on 2/16 due to the bad weather,  Please reschedule the appt. For the patient .. Please call [phone]

## 2023-06-29 ENCOUNTER — TELEPHONE (OUTPATIENT)
Dept: FAMILY MEDICINE | Facility: CLINIC | Age: 48
End: 2023-06-29
Payer: MEDICAID

## 2023-06-29 NOTE — TELEPHONE ENCOUNTER
----- Message from DARWIN Carvajal sent at 6/29/2023  2:57 PM CDT -----  Mere could you please reschedule patients mammo and us. If you can give her the GI number she will need to call and schedule her colonoscopy  ----- Message -----  From: Devyn Bird  Sent: 6/29/2023   2:33 PM CDT  To: DARWIN Carvajal    PT SAID THAT SHE NEEDS HER ULTRA SOUND,MAMMO  & COLON TEST APTS RESCHEDULE  PT [phone]

## 2023-07-11 ENCOUNTER — HOSPITAL ENCOUNTER (OUTPATIENT)
Dept: RADIOLOGY | Facility: HOSPITAL | Age: 48
Discharge: HOME | End: 2023-07-11
Attending: NURSE PRACTITIONER
Payer: MEDICAID

## 2023-07-11 DIAGNOSIS — N92.1 MENORRHAGIA WITH IRREGULAR CYCLE: ICD-10-CM

## 2023-07-11 PROCEDURE — 76856 US PELVIS COMPLETE NON OB: ICD-10-PCS | Mod: 26,,, | Performed by: RADIOLOGY

## 2023-07-11 PROCEDURE — 76856 US EXAM PELVIC COMPLETE: CPT | Mod: TC

## 2023-07-11 PROCEDURE — 76856 US EXAM PELVIC COMPLETE: CPT | Mod: 26,,, | Performed by: RADIOLOGY

## 2023-07-14 ENCOUNTER — PATIENT OUTREACH (OUTPATIENT)
Dept: ADMINISTRATIVE | Facility: HOSPITAL | Age: 48
End: 2023-07-14

## 2023-07-14 NOTE — LETTER
July 14, 2023           Madelaine Palacio  [address] [place] [address] [place] [id number]        Dear Madelaine,    Our records indicate you are due for colon cancer screening. If you have already had your screening please let us know when and where the last colon cancer screening was done so we can update our records.      If you are due for colon cancer screening then we would be happy to help in getting this taken care of. Colon cancer is the third most common type of cancer and is the second most common cause of death from cancer in the [place]. A large portion of colon cancers are preventable with appropriate screening. Also, when colon cancer is found in earlier stages it has a much higher likelihood of being cured.    There are several options for colon cancer screening and each has benefits and downfalls so there is no one-size-fits-all test. Below you will find a list on the available tests:    Colonoscopy  Stool testing for blood (Fit Kit)  Stool DNA testing (Cologuard)     Please call [phone] to schedule an appointment with your primary care physician to discuss the different tests and find which is best for you.    Thank you for your time and we look forward to working with you to keep you as healthy as possible in the future.     Sincerely,    DARWIN Carvajal and your [facility]

## 2023-07-14 NOTE — PROGRESS NOTES
No documentation found in [facility], [facility], or [facility] for colonoscopy. Care everywhere not available. Pt canceled c-scope on 4/11/2023. No upcoming appt scheduled with PCP. Portal not used since 2021. Letter mailed to pt about scheduling a c-scope. Comment placed in the chart that pt needs this.

## 2023-07-17 DIAGNOSIS — N92.4 EXCESSIVE BLEEDING IN PREMENOPAUSAL PERIOD: Primary | ICD-10-CM

## 2023-07-17 DIAGNOSIS — D25.9 UTERINE LEIOMYOMA, UNSPECIFIED LOCATION: ICD-10-CM

## 2023-08-03 ENCOUNTER — TELEPHONE (OUTPATIENT)
Dept: FAMILY MEDICINE | Facility: CLINIC | Age: 48
End: 2023-08-03
Payer: MEDICAID

## 2023-08-03 DIAGNOSIS — Z12.11 SCREEN FOR COLON CANCER: Primary | ICD-10-CM

## 2023-08-03 NOTE — TELEPHONE ENCOUNTER
Alyse states this patient called earlier unsure if it was about labs she wanted to call about. I tried calling but got busy signal. Thank you

## 2023-08-04 ENCOUNTER — TELEPHONE (OUTPATIENT)
Dept: FAMILY MEDICINE | Facility: CLINIC | Age: 48
End: 2023-08-04
Payer: MEDICAID

## 2023-08-08 ENCOUNTER — APPOINTMENT (OUTPATIENT)
Dept: RADIOLOGY | Facility: CLINIC | Age: 48
End: 2023-08-08
Attending: NURSE PRACTITIONER
Payer: MEDICAID

## 2023-08-08 ENCOUNTER — OFFICE VISIT (OUTPATIENT)
Dept: FAMILY MEDICINE | Facility: CLINIC | Age: 48
End: 2023-08-08
Payer: MEDICAID

## 2023-08-08 VITALS
TEMPERATURE: 98 F | DIASTOLIC BLOOD PRESSURE: 61 MMHG | RESPIRATION RATE: 20 BRPM | BODY MASS INDEX: 33.27 KG/M2 | SYSTOLIC BLOOD PRESSURE: 99 MMHG | WEIGHT: 207 LBS | HEIGHT: 66 IN | HEART RATE: 88 BPM | OXYGEN SATURATION: 99 %

## 2023-08-08 DIAGNOSIS — R10.2 PELVIC PAIN: ICD-10-CM

## 2023-08-08 DIAGNOSIS — G89.29 CHRONIC LEFT SHOULDER PAIN: ICD-10-CM

## 2023-08-08 DIAGNOSIS — J45.20 MILD INTERMITTENT ASTHMA WITHOUT COMPLICATION: ICD-10-CM

## 2023-08-08 DIAGNOSIS — Z86.2 HISTORY OF ANEMIA: ICD-10-CM

## 2023-08-08 DIAGNOSIS — M25.512 CHRONIC LEFT SHOULDER PAIN: ICD-10-CM

## 2023-08-08 DIAGNOSIS — F12.90 MARIJUANA USER: ICD-10-CM

## 2023-08-08 DIAGNOSIS — I10 HYPERTENSION, UNSPECIFIED TYPE: Primary | ICD-10-CM

## 2023-08-08 DIAGNOSIS — D25.9 UTERINE LEIOMYOMA, UNSPECIFIED LOCATION: ICD-10-CM

## 2023-08-08 LAB
ANISOCYTOSIS BLD QL SMEAR: NORMAL
BASOPHILS # BLD AUTO: 0.01 K/UL (ref 0–0.2)
BASOPHILS NFR BLD AUTO: 0.1 % (ref 0–1)
CANDIDA SPECIES: NEGATIVE
DIFFERENTIAL METHOD BLD: ABNORMAL
EOSINOPHIL # BLD AUTO: 0.15 K/UL (ref 0–0.5)
EOSINOPHIL NFR BLD AUTO: 2.2 % (ref 1–4)
ERYTHROCYTE [DISTWIDTH] IN BLOOD BY AUTOMATED COUNT: 17.4 % (ref 11.5–14.5)
GARDNERELLA: POSITIVE
HCT VFR BLD AUTO: 31.1 % (ref 38–47)
HGB BLD-MCNC: 9.1 G/DL (ref 12–16)
HYPOCHROMIA BLD QL SMEAR: NORMAL
IMM GRANULOCYTES # BLD AUTO: 0.02 K/UL (ref 0–0.04)
IMM GRANULOCYTES NFR BLD: 0.3 % (ref 0–0.4)
IRON SATN MFR SERPL: 4 % (ref 14–50)
IRON SERPL-MCNC: 20 ΜG/DL (ref 50–170)
LYMPHOCYTES # BLD AUTO: 1.89 K/UL (ref 1–4.8)
LYMPHOCYTES NFR BLD AUTO: 27.4 % (ref 27–41)
MCH RBC QN AUTO: 21.4 PG (ref 27–31)
MCHC RBC AUTO-ENTMCNC: 29.3 G/DL (ref 32–36)
MCV RBC AUTO: 73.2 FL (ref 80–96)
MICROCYTES BLD QL SMEAR: NORMAL
MONOCYTES # BLD AUTO: 0.86 K/UL (ref 0–0.8)
MONOCYTES NFR BLD AUTO: 12.4 % (ref 2–6)
MPC BLD CALC-MCNC: 9.1 FL (ref 9.4–12.4)
NEUTROPHILS # BLD AUTO: 3.98 K/UL (ref 1.8–7.7)
NEUTROPHILS NFR BLD AUTO: 57.6 % (ref 53–65)
NRBC # BLD AUTO: 0 X10E3/UL
NRBC, AUTO (.00): 0 %
PLATELET # BLD AUTO: 374 K/UL (ref 150–400)
PLATELET MORPHOLOGY: NORMAL
RBC # BLD AUTO: 4.25 M/UL (ref 4.2–5.4)
TIBC SERPL-MCNC: 475 ΜG/DL (ref 250–450)
TRICHOMONAS: NEGATIVE
WBC # BLD AUTO: 6.91 K/UL (ref 4.5–11)

## 2023-08-08 PROCEDURE — 87480 CANDIDA DNA DIR PROBE: CPT | Mod: ,,, | Performed by: CLINICAL MEDICAL LABORATORY

## 2023-08-08 PROCEDURE — 87510 GARDNER VAG DNA DIR PROBE: CPT | Mod: ,,, | Performed by: CLINICAL MEDICAL LABORATORY

## 2023-08-08 PROCEDURE — 3074F SYST BP LT 130 MM HG: CPT | Mod: CPTII,,, | Performed by: NURSE PRACTITIONER

## 2023-08-08 PROCEDURE — 3008F PR BODY MASS INDEX (BMI) DOCUMENTED: ICD-10-PCS | Mod: CPTII,,, | Performed by: NURSE PRACTITIONER

## 2023-08-08 PROCEDURE — 87510 BACTERIAL VAGINOSIS: ICD-10-PCS | Mod: ,,, | Performed by: CLINICAL MEDICAL LABORATORY

## 2023-08-08 PROCEDURE — 1160F PR REVIEW ALL MEDS BY PRESCRIBER/CLIN PHARMACIST DOCUMENTED: ICD-10-PCS | Mod: CPTII,,, | Performed by: NURSE PRACTITIONER

## 2023-08-08 PROCEDURE — 73030 X-RAY EXAM OF SHOULDER: CPT | Mod: TC,RHCUB,FY,LT | Performed by: NURSE PRACTITIONER

## 2023-08-08 PROCEDURE — 83540 IRON AND TIBC: ICD-10-PCS | Mod: ,,, | Performed by: CLINICAL MEDICAL LABORATORY

## 2023-08-08 PROCEDURE — 3078F DIAST BP <80 MM HG: CPT | Mod: CPTII,,, | Performed by: NURSE PRACTITIONER

## 2023-08-08 PROCEDURE — 1159F PR MEDICATION LIST DOCUMENTED IN MEDICAL RECORD: ICD-10-PCS | Mod: CPTII,,, | Performed by: NURSE PRACTITIONER

## 2023-08-08 PROCEDURE — 85025 COMPLETE CBC W/AUTO DIFF WBC: CPT | Mod: ,,, | Performed by: CLINICAL MEDICAL LABORATORY

## 2023-08-08 PROCEDURE — 83540 ASSAY OF IRON: CPT | Mod: ,,, | Performed by: CLINICAL MEDICAL LABORATORY

## 2023-08-08 PROCEDURE — 87660 BACTERIAL VAGINOSIS: ICD-10-PCS | Mod: ,,, | Performed by: CLINICAL MEDICAL LABORATORY

## 2023-08-08 PROCEDURE — 96372 THER/PROPH/DIAG INJ SC/IM: CPT | Mod: ,,, | Performed by: NURSE PRACTITIONER

## 2023-08-08 PROCEDURE — 3074F PR MOST RECENT SYSTOLIC BLOOD PRESSURE < 130 MM HG: ICD-10-PCS | Mod: CPTII,,, | Performed by: NURSE PRACTITIONER

## 2023-08-08 PROCEDURE — 83550 IRON BINDING TEST: CPT | Mod: ,,, | Performed by: CLINICAL MEDICAL LABORATORY

## 2023-08-08 PROCEDURE — 1160F RVW MEDS BY RX/DR IN RCRD: CPT | Mod: CPTII,,, | Performed by: NURSE PRACTITIONER

## 2023-08-08 PROCEDURE — 1159F MED LIST DOCD IN RCRD: CPT | Mod: CPTII,,, | Performed by: NURSE PRACTITIONER

## 2023-08-08 PROCEDURE — 83550 IRON AND TIBC: ICD-10-PCS | Mod: ,,, | Performed by: CLINICAL MEDICAL LABORATORY

## 2023-08-08 PROCEDURE — 85025 CBC WITH DIFFERENTIAL: ICD-10-PCS | Mod: ,,, | Performed by: CLINICAL MEDICAL LABORATORY

## 2023-08-08 PROCEDURE — 3008F BODY MASS INDEX DOCD: CPT | Mod: CPTII,,, | Performed by: NURSE PRACTITIONER

## 2023-08-08 PROCEDURE — 99214 OFFICE O/P EST MOD 30 MIN: CPT | Mod: 25,,, | Performed by: NURSE PRACTITIONER

## 2023-08-08 PROCEDURE — 99214 PR OFFICE/OUTPT VISIT, EST, LEVL IV, 30-39 MIN: ICD-10-PCS | Mod: 25,,, | Performed by: NURSE PRACTITIONER

## 2023-08-08 PROCEDURE — 87660 TRICHOMONAS VAGIN DIR PROBE: CPT | Mod: ,,, | Performed by: CLINICAL MEDICAL LABORATORY

## 2023-08-08 PROCEDURE — 87480 BACTERIAL VAGINOSIS: ICD-10-PCS | Mod: ,,, | Performed by: CLINICAL MEDICAL LABORATORY

## 2023-08-08 PROCEDURE — 3078F PR MOST RECENT DIASTOLIC BLOOD PRESSURE < 80 MM HG: ICD-10-PCS | Mod: CPTII,,, | Performed by: NURSE PRACTITIONER

## 2023-08-08 PROCEDURE — 96372 PR INJECTION,THERAP/PROPH/DIAG2ST, IM OR SUBCUT: ICD-10-PCS | Mod: ,,, | Performed by: NURSE PRACTITIONER

## 2023-08-08 RX ORDER — NAPROXEN 500 MG/1
500 TABLET ORAL 2 TIMES DAILY PRN
Qty: 30 TABLET | Refills: 0 | Status: SHIPPED | OUTPATIENT
Start: 2023-08-08

## 2023-08-08 RX ORDER — FERROUS SULFATE 324(65)MG
324 TABLET, DELAYED RELEASE (ENTERIC COATED) ORAL 2 TIMES DAILY
Qty: 30 TABLET | Refills: 2 | Status: SHIPPED | OUTPATIENT
Start: 2023-08-08

## 2023-08-08 RX ORDER — DEXAMETHASONE SODIUM PHOSPHATE 4 MG/ML
4 INJECTION, SOLUTION INTRA-ARTICULAR; INTRALESIONAL; INTRAMUSCULAR; INTRAVENOUS; SOFT TISSUE
Status: COMPLETED | OUTPATIENT
Start: 2023-08-08 | End: 2023-08-08

## 2023-08-08 RX ADMIN — DEXAMETHASONE SODIUM PHOSPHATE 4 MG: 4 INJECTION, SOLUTION INTRA-ARTICULAR; INTRALESIONAL; INTRAMUSCULAR; INTRAVENOUS; SOFT TISSUE at 03:08

## 2023-08-08 NOTE — PROGRESS NOTES
DARWIN Carvajal   [facility]      PATIENT NAME: Madelaine Palacio  : 1975  DATE: 23  MRN: [id number]      Patient PCP Information       Provider PCP Type    Jazmín P DARWIN Romero General            Reason for Visit / Chief Complaint: Follow-up (Patient presents to the clinic for f/u on htn and shoulder( also asthma is flaring up)/Rm2) and Hypertension       History of Present Illness / Problem Focused Workflow     Madelaine Palacio presents to the clinic with Follow-up (Patient presents to the clinic for f/u on htn and shoulder( also asthma is flaring up)/Rm2) and Hypertension     HPI  Ms. Palacio presents to clinic for followup.   Patient has hx of htn for which she was prescribed norvasc.   Patient BP has been trending down since anemia worsened.   Took iron for month out of prescription needs refill.   23-23 cycle last >20 days.   Scheduled to see Dr Almanzar uterine fibroid  Followup labs ordered today.   Reports lower pelvic discomfort no vaginal discharge.   Denies odor or vaginal discharge.     Bilateral shoulder pain, left > right, front of left shoulder. Pain worse when lifting great niece. Pain ongoing for years.   Has not taken anything for pain. Hx of carpal tunnel as well.   Denies family hx of CAD.    Smoker-marijuana  Advised to quit smoking  Impact of smoking on health discussed  Discussed method and skills for cessation  Patient has been around niece who has cold.   Patient with and feels asthma is exacerbated  Occasional wheezing     Review of Systems     Review of Systems   Constitutional:  Positive for fatigue. Negative for activity change, appetite change, chills, diaphoresis, fever and unexpected weight change.   HENT:  Negative for congestion, ear pain, facial swelling, hearing loss, nosebleeds and sore throat.    Eyes: Negative.    Respiratory:  Positive for chest tightness, shortness of breath and wheezing. Negative for apnea and cough.    Cardiovascular:  Negative for  chest pain, palpitations and leg swelling.   Gastrointestinal:  Negative for abdominal distention, abdominal pain, blood in stool, constipation, diarrhea and nausea.   Endocrine: Negative for cold intolerance, heat intolerance, polydipsia, polyphagia and polyuria.   Genitourinary:  Positive for menstrual problem. Negative for decreased urine volume, difficulty urinating, dysuria, flank pain, frequency, hematuria and urgency.   Musculoskeletal:  Positive for arthralgias. Negative for joint swelling and myalgias.   Skin:  Negative for color change and rash.   Neurological:  Negative for dizziness, tremors, seizures, syncope, facial asymmetry, speech difficulty, weakness, light-headedness, numbness and headaches.   Hematological:  Negative for adenopathy. Does not bruise/bleed easily.   Psychiatric/Behavioral:  Negative for behavioral problems and confusion.        Medical / Social / Family History     Past Medical History:   Diagnosis Date    Anemia, unspecified     Asthma     Hypertension     Uterine fibroid        Past Surgical History:   Procedure Laterality Date    left wrist fracture      right wrist skin graft         Social History  Ms.  reports that she has quit smoking. She has never used smokeless tobacco. She reports current alcohol use. She reports that she does not currently use drugs.    Family History  Ms.'s family history includes Diabetes in her mother; Hypertension in her maternal grandmother and mother.    Medications and Allergies     Medications  Outpatient Medications Marked as Taking for the 8/8/23 encounter (Office Visit) with Jazmín Romero FNP   Medication Sig Dispense Refill    albuterol (PROVENTIL/VENTOLIN HFA) 90 mcg/actuation inhaler Inhale 2 puffs into the lungs every 6 (six) hours as needed for Wheezing. Rescue 18 g 2    amLODIPine (NORVASC) 5 MG tablet Take 1 tablet (5 mg total) by mouth once daily. 90 tablet 3     Current Facility-Administered Medications for the 8/8/23 encounter  "(Office Visit) with Jazmín Romero FNP   Medication Dose Route Frequency Provider Last Rate Last Admin    [COMPLETED] dexAMETHasone injection 4 mg  4 mg Intramuscular 1 time in Clinic/HOD Jazmín Romero FNP   4 mg at 08/08/23 1558       Allergies  Review of patient's allergies indicates:  No Known Allergies    Physical Examination     Vitals:    08/08/23 1522   BP: 99/61   BP Location: Right arm   Patient Position: Sitting   BP Method: Medium (Automatic)   Pulse: 88   Resp: 20   Temp: 98 °F (36.7 °C)   TempSrc: Oral   SpO2: 99%   Weight: 93.9 kg (207 lb)   Height: 5' 5.5" (1.664 m)       Physical Exam  Vitals and nursing note reviewed.   Constitutional:       Appearance: Normal appearance. She is obese.   HENT:      Head: Normocephalic.      Right Ear: External ear normal.      Left Ear: External ear normal.      Nose: Nose normal.      Mouth/Throat:      Mouth: Mucous membranes are moist.   Eyes:      Extraocular Movements: Extraocular movements intact.      Conjunctiva/sclera: Conjunctivae normal.      Pupils: Pupils are equal, round, and reactive to light.   Cardiovascular:      Rate and Rhythm: Normal rate and regular rhythm.      Pulses: Normal pulses.      Heart sounds: Normal heart sounds. No murmur heard.  Pulmonary:      Effort: Pulmonary effort is normal.      Breath sounds: No stridor. Wheezing present. No rhonchi.   Abdominal:      General: Bowel sounds are normal. There is no distension.      Palpations: Abdomen is soft. There is no mass.      Tenderness: There is no abdominal tenderness.   Musculoskeletal:         General: Tenderness present. No swelling. Normal range of motion.      Cervical back: Normal range of motion and neck supple.      Right lower leg: No edema.      Left lower leg: No edema.      Comments: Left shoulder tender to palpation no swelling or deformity  FROM does reports pain with ROM   Skin:     General: Skin is warm and dry.      Capillary Refill: Capillary refill takes " less than 2 seconds.   Neurological:      General: No focal deficit present.      Mental Status: She is alert and oriented to person, place, and time. Mental status is at baseline.      Cranial Nerves: No cranial nerve deficit.      Sensory: No sensory deficit.      Motor: No weakness.   Psychiatric:         Mood and Affect: Mood normal.         Behavior: Behavior normal.         Thought Content: Thought content normal.         Judgment: Judgment normal.           No visits with results within 14 Day(s) from this visit.   Latest known visit with results is:   Office Visit on 01/24/2023   Component Date Value Ref Range Status    Color, UA 01/24/2023 Yellow   Final    Spec Grav UA 01/24/2023 >1.030   Final    pH, UA 01/24/2023 6.0   Final    WBC, UA 01/24/2023 NEG   Final    Nitrite, UA 01/24/2023 NEG   Final    Protein, POC 01/24/2023 TRACE   Final    Glucose, UA 01/24/2023 NEG   Final    Ketones, UA 01/24/2023 NEG   Final    Bilirubin, POC 01/24/2023 NEG   Final    Urobilinogen, UA 01/24/2023 0.2   Final    Blood, UA 01/24/2023 NEG   Final    Case Report 01/24/2023    Final                    Value:Pap Cytology                                      Case: [id number]                                   Authorizing Provider:  DARWIN Carvajal      Collected:           01/24/2023 03:11 PM          Ordering Location:     [facility] -    Received:            01/24/2023 03:11 PM                                 [place] [facility]                                                    First Screen:          TY [place](ASCP)                                                    Specimen:    Liquid-Based Pap Test, Screening, Endocervix                                               Interpretation 01/24/2023 Negative              Final    Inflammation       General Categorization 01/24/2023 Negative for intraepithelial lesion or malignancy   Final    Specimen Adequacy 01/24/2023 Satisfactory for evaluation    Final    Clinical Information 01/24/2023    Final                    Value:This result contains rich text formatting which cannot be displayed here.    Disclaimer 01/24/2023    Final                    Value:This result contains rich text formatting which cannot be displayed here.    Candida Species 01/24/2023 Negative  Negative, Invalid Final    Gardnerella 01/24/2023 Positive (A)  Negative, Invalid Final    Trichomonas 01/24/2023 Negative  Negative, Invalid Final    Chlamydia by PCR 01/24/2023 Negative  Negative, Invalid Final    N. gonorrhoeae (GC) by PCR 01/24/2023 Negative  Negative, Invalid Final    Sodium 01/24/2023 140  136 - 145 mmol/L Final    Potassium 01/24/2023 3.3 (L)  3.5 - 5.1 mmol/L Final    Chloride 01/24/2023 109 (H)  98 - 107 mmol/L Final    CO2 01/24/2023 27  21 - 32 mmol/L Final    Anion Gap 01/24/2023 7  7 - 16 mmol/L Final    Glucose 01/24/2023 77  74 - 106 mg/dL Final    BUN 01/24/2023 9  7 - 18 mg/dL Final    Creatinine 01/24/2023 0.67  0.55 - 1.02 mg/dL Final    BUN/Creatinine Ratio 01/24/2023 13  6 - 20 Final    Calcium 01/24/2023 9.1  8.5 - 10.1 mg/dL Final    Total Protein 01/24/2023 7.8  6.4 - 8.2 g/dL Final    Albumin 01/24/2023 3.6  3.5 - 5.0 g/dL Final    Globulin 01/24/2023 4.2 (H)  2.0 - 4.0 g/dL Final    A/G Ratio 01/24/2023 0.9   Final    Bilirubin, Total 01/24/2023 0.2  >0.0 - 1.2 mg/dL Final    Alk Phos 01/24/2023 84  39 - 100 U/L Final    ALT 01/24/2023 20  13 - 56 U/L Final    AST 01/24/2023 10 (L)  15 - 37 U/L Final    eGFR 01/24/2023 109  >=60 mL/min/1.73m² Final    Triglycerides 01/24/2023 76  35 - 150 mg/dL Final      Normal:  <150 mg/dL  Borderline High: 150-199 mg/dL  High:   200-499 mg/dL  Very High:  >=500    Cholesterol 01/24/2023 154  0 - 200 mg/dL Final      <200 mg/dL:  Desirable  200-240 mg/dL: Borderline High  >240 mg/dL:  High    HDL Cholesterol 01/24/2023 84 (H)  40 - 60 mg/dL Final      <40 mg/dL: Low HDL  40-60 mg/dL: Normal  >60 mg/dL: Desirable     Cholesterol/HDL Ratio (Risk Factor) 01/24/2023 1.8   Final    Non-HDL 01/24/2023 70  mg/dL Final    LDL Calculated 01/24/2023 55  mg/dL Final    Unable to calculate due to one of the following values:  Cholesterol <5  HDL Cholesterol <5  Triglycerides <10 or >400    VLDL 01/24/2023 15  mg/dL Final    HIV 1/2 01/24/2023 Non-Reactive  Non-Reactive Final    Hepatitis C Ab 01/24/2023 Non-Reactive  Non-Reactive Final    WBC 01/24/2023 10.29  4.50 - 11.00 K/uL Final    RBC 01/24/2023 4.01 (L)  4.20 - 5.40 M/uL Final    Hemoglobin 01/24/2023 8.7 (L)  12.0 - 16.0 g/dL Final    Hematocrit 01/24/2023 29.4 (L)  38.0 - 47.0 % Final    MCV 01/24/2023 73.3 (L)  80.0 - 96.0 fL Final    MCH 01/24/2023 21.7 (L)  27.0 - 31.0 pg Final    MCHC 01/24/2023 29.6 (L)  32.0 - 36.0 g/dL Final    RDW 01/24/2023 17.1 (H)  11.5 - 14.5 % Final    Platelet Count 01/24/2023 454 (H)  150 - 400 K/uL Final    MPV 01/24/2023 9.2 (L)  9.4 - 12.4 fL Final    Neutrophils % 01/24/2023 53.6  53.0 - 65.0 % Final    Lymphocytes % 01/24/2023 34.9  27.0 - 41.0 % Final    Monocytes % 01/24/2023 9.4 (H)  2.0 - 6.0 % Final    Eosinophils % 01/24/2023 1.5  1.0 - 4.0 % Final    Basophils % 01/24/2023 0.3  0.0 - 1.0 % Final    Immature Granulocytes % 01/24/2023 0.3  0.0 - 0.4 % Final    nRBC, Auto 01/24/2023 0.0  <=0.0 % Final    Neutrophils, Abs 01/24/2023 5.52  1.80 - 7.70 K/uL Final    Lymphocytes, Absolute 01/24/2023 3.59  1.00 - 4.80 K/uL Final    Monocytes, Absolute 01/24/2023 0.97 (H)  0.00 - 0.80 K/uL Final    Eosinophils, Absolute 01/24/2023 0.15  0.00 - 0.50 K/uL Final    Basophils, Absolute 01/24/2023 0.03  0.00 - 0.20 K/uL Final    Immature Granulocytes, Absolute 01/24/2023 0.03  0.00 - 0.04 K/uL Final    nRBC, Absolute 01/24/2023 0.00  <=0.00 x10e3/uL Final    Diff Type 01/24/2023 Scan Smear   Final    Platelet Morphology 01/24/2023 Increased (A)  Normal Final    Anisocytosis 01/24/2023 1+   Final    Microcytosis 01/24/2023 1+   Final    Polychromasia  01/24/2023 Few   Final             Assessment and Plan (including Health Maintenance)       Plan:   Hypertension, unspecified type  Comments:  BP running low, monitor at home. Hold amlodipine for SBP<100 DBP<70    History of anemia  -     CBC Auto Differential; Future; Expected date: 08/08/2023  -     Iron and TIBC; Future; Expected date: 08/08/2023  -     ferrous sulfate 324 mg (65 mg iron) TbEC; Take 1 tablet (324 mg total) by mouth 2 (two) times daily.  Dispense: 30 tablet; Refill: 2    Uterine leiomyoma, unspecified location  Comments:  appt scheduled for Dr Almanzar next week    Pelvic pain  -     Bacterial Vaginosis; Future; Expected date: 08/08/2023    Mild intermittent asthma without complication  -     dexAMETHasone injection 4 mg    Chronic left shoulder pain  -     X-Ray Shoulder 2 or More Views Left; Future; Expected date: 08/08/2023  -     naproxen (NAPROSYN) 500 MG tablet; Take 1 tablet (500 mg total) by mouth 2 (two) times daily as needed (shoulder pain with meals).  Dispense: 30 tablet; Refill: 0    Marijuana user      -Advised to quit smoking  Impact of smoking on health discussed  Discussed method and skills for cessation       There are no Patient Instructions on file for this visit.       Health Maintenance Due   Topic Date Due    Pneumococcal Vaccines (Age 0-64) (1 - PCV) Never done    Colorectal Cancer Screening  Never done    Mammogram  03/16/2022       Most Recent Immunizations   Administered Date(s) Administered    COVID-19, MRNA, LN-S, PF (Pfizer) ([place]) 08/19/2021        Problem List Items Addressed This Visit          Pulmonary    Mild intermittent asthma without complication    Relevant Medications    dexAMETHasone injection 4 mg (Completed)       Cardiac/Vascular    Hypertension - Primary    Overview     BP running low            Renal/    Uterine leiomyoma       Oncology    History of anemia    Relevant Medications    ferrous sulfate 324 mg (65 mg iron) TbEC    Other Relevant Orders     CBC Auto Differential    Iron and TIBC       GI    Pelvic pain    Relevant Orders    Bacterial Vaginosis       Orthopedic    Chronic left shoulder pain    Relevant Medications    naproxen (NAPROSYN) 500 MG tablet    Other Relevant Orders    X-Ray Shoulder 2 or More Views Left       Palliative Care    Marijuana user       Health Maintenance Topics with due status: Not Due       Topic Last Completion Date    Hemoglobin A1c (Diabetic Prevention Screening) 12/16/2021    Influenza Vaccine 01/24/2023    Cervical Cancer Screening 01/24/2023    Lipid Panel 01/24/2023       Future Appointments   Date Time Provider Department Center   8/15/2023 11:00 AM Atul Almanzar MD OB O[facility]   11/29/2023  1:45 PM [facility] [place]            Signature:  DARWIN Carvajal  [facility]     Date of encounter: 8/8/23

## 2023-08-15 ENCOUNTER — OFFICE VISIT (OUTPATIENT)
Dept: OBSTETRICS AND GYNECOLOGY | Facility: CLINIC | Age: 48
End: 2023-08-15
Payer: MEDICAID

## 2023-08-15 VITALS
SYSTOLIC BLOOD PRESSURE: 130 MMHG | WEIGHT: 203.38 LBS | HEIGHT: 66 IN | DIASTOLIC BLOOD PRESSURE: 68 MMHG | BODY MASS INDEX: 32.68 KG/M2

## 2023-08-15 DIAGNOSIS — D25.9 UTERINE LEIOMYOMA, UNSPECIFIED LOCATION: ICD-10-CM

## 2023-08-15 DIAGNOSIS — N92.4 EXCESSIVE BLEEDING IN PREMENOPAUSAL PERIOD: ICD-10-CM

## 2023-08-15 PROCEDURE — 3075F SYST BP GE 130 - 139MM HG: CPT | Mod: CPTII,,, | Performed by: OBSTETRICS & GYNECOLOGY

## 2023-08-15 PROCEDURE — 99213 PR OFFICE/OUTPT VISIT, EST, LEVL III, 20-29 MIN: ICD-10-PCS | Mod: S$PBB,,, | Performed by: OBSTETRICS & GYNECOLOGY

## 2023-08-15 PROCEDURE — 3075F PR MOST RECENT SYSTOLIC BLOOD PRESS GE 130-139MM HG: ICD-10-PCS | Mod: CPTII,,, | Performed by: OBSTETRICS & GYNECOLOGY

## 2023-08-15 PROCEDURE — 3008F BODY MASS INDEX DOCD: CPT | Mod: CPTII,,, | Performed by: OBSTETRICS & GYNECOLOGY

## 2023-08-15 PROCEDURE — 3008F PR BODY MASS INDEX (BMI) DOCUMENTED: ICD-10-PCS | Mod: CPTII,,, | Performed by: OBSTETRICS & GYNECOLOGY

## 2023-08-15 PROCEDURE — 99213 OFFICE O/P EST LOW 20 MIN: CPT | Mod: PBBFAC | Performed by: OBSTETRICS & GYNECOLOGY

## 2023-08-15 PROCEDURE — 3078F PR MOST RECENT DIASTOLIC BLOOD PRESSURE < 80 MM HG: ICD-10-PCS | Mod: CPTII,,, | Performed by: OBSTETRICS & GYNECOLOGY

## 2023-08-15 PROCEDURE — 3078F DIAST BP <80 MM HG: CPT | Mod: CPTII,,, | Performed by: OBSTETRICS & GYNECOLOGY

## 2023-08-15 PROCEDURE — 99213 OFFICE O/P EST LOW 20 MIN: CPT | Mod: S$PBB,,, | Performed by: OBSTETRICS & GYNECOLOGY

## 2023-08-15 NOTE — PATIENT INSTRUCTIONS
Discussed my recommendations for hysterectomy.      Patient desires not having hysterectomy at present.      Discussed follow-up for endometrial biopsy.  We will then place on cyclic progesterone therapy.      In addition we have discussed the possibility of endometrial ablation in the future.    Discussed increasing iron supplements to twice daily.    Follow-up appointment Monday for endometrial biopsy.    CBC and hCG ordered dante results pending

## 2023-08-15 NOTE — PROGRESS NOTES
Subjective:       Patient ID: Madelaine Palacio is a 48 y.o. female.    Chief Complaint: Dysfunctional Uterine Bleeding (Pt presents with hx of irregular menses x 1 year-will come on and stay for 20 days, go off/come back home. The cycle is heavy, had to wear diapers. She was told she has fibroids. )    Presents new to my practice.      Referred by Ms. Romero nurse practitioner.      History of significant bleeding with occasionally bleeding up to 20 days per month.  Recent CBC August 8, 2023 revealed hematocrit of 31%.      Pap done January 2023 was class 1.      Recent ultrasound revealed multiple uterine leiomyoma up to 4.1 cm diameter.          Review of Systems      Objective:      Physical Exam  Genitourinary:     Comments: External normal vault normal cervix normal to appearance initially there was blood noted in the posterior vaginal fourchette.  This was cleared with sponge stick.  Thereafter only minimal oozing of the cervix was noted cervix was otherwise unremarkable.  On bimanual examination uterus irregular shaped approximately 14 week size.    Ultrasound revealed uterine length of 13.2 cm with multiple uterine fibroids noted.        Assessment:       1. Excessive bleeding in premenopausal period    2. Uterine leiomyoma, unspecified location        Plan:       Patient Instructions   Discussed my recommendations for hysterectomy.      Patient desires not having hysterectomy at present.      Discussed follow-up for endometrial biopsy.  We will then place on cyclic progesterone therapy.      In addition we have discussed the possibility of endometrial ablation in the future.    Discussed increasing iron supplements to twice daily.    Follow-up appointment Monday for endometrial biopsy.    CBC and hCG ordered dante results pending

## 2023-08-16 DIAGNOSIS — M19.90 OSTEOARTHRITIS, UNSPECIFIED OSTEOARTHRITIS TYPE, UNSPECIFIED SITE: ICD-10-CM

## 2023-08-16 DIAGNOSIS — G89.29 CHRONIC LEFT SHOULDER PAIN: Primary | ICD-10-CM

## 2023-08-16 DIAGNOSIS — M25.70 OSTEOPHYTE DETERMINED BY X-RAY: ICD-10-CM

## 2023-08-16 DIAGNOSIS — M25.512 CHRONIC LEFT SHOULDER PAIN: Primary | ICD-10-CM

## 2023-08-16 RX ORDER — METRONIDAZOLE 500 MG/1
500 TABLET ORAL EVERY 12 HOURS
Qty: 14 TABLET | Refills: 0 | Status: SHIPPED | OUTPATIENT
Start: 2023-08-16 | End: 2023-08-23

## 2023-08-17 ENCOUNTER — TELEPHONE (OUTPATIENT)
Dept: OBSTETRICS AND GYNECOLOGY | Facility: CLINIC | Age: 48
End: 2023-08-17
Payer: MEDICAID

## 2023-08-17 NOTE — TELEPHONE ENCOUNTER
Discussed hematocrit only 1 point lower than previously.  She will continue iron supplements.  She states vaginal bleeding has decreased.      She has a follow-up appointment with me on Monday for endometrial biopsy.      I have again discussed my recommendation for proceeding with hysterectomy secondary to significant size uterine leiomyoma.    Patient reluctant to proceed with hysterectomy.  Discussed potential option of endometrial ablation.  We will discuss this further when she presents to the office.

## 2023-09-05 ENCOUNTER — OFFICE VISIT (OUTPATIENT)
Dept: ORTHOPEDICS | Facility: CLINIC | Age: 48
End: 2023-09-05
Payer: MEDICAID

## 2023-09-05 DIAGNOSIS — M19.90 OSTEOARTHRITIS, UNSPECIFIED OSTEOARTHRITIS TYPE, UNSPECIFIED SITE: ICD-10-CM

## 2023-09-05 DIAGNOSIS — M25.70 OSTEOPHYTE DETERMINED BY X-RAY: ICD-10-CM

## 2023-09-05 DIAGNOSIS — G89.29 CHRONIC LEFT SHOULDER PAIN: ICD-10-CM

## 2023-09-05 DIAGNOSIS — M25.512 CHRONIC LEFT SHOULDER PAIN: ICD-10-CM

## 2023-09-05 PROCEDURE — 99999PBSHW PR PBB SHADOW TECHNICAL ONLY FILED TO HB: ICD-10-PCS | Mod: PBBFAC,,,

## 2023-09-05 PROCEDURE — 99203 OFFICE O/P NEW LOW 30 MIN: CPT | Mod: S$PBB,25,, | Performed by: NURSE PRACTITIONER

## 2023-09-05 PROCEDURE — 99212 OFFICE O/P EST SF 10 MIN: CPT | Mod: PBBFAC | Performed by: NURSE PRACTITIONER

## 2023-09-05 PROCEDURE — 99203 PR OFFICE/OUTPT VISIT, NEW, LEVL III, 30-44 MIN: ICD-10-PCS | Mod: S$PBB,25,, | Performed by: NURSE PRACTITIONER

## 2023-09-05 PROCEDURE — 20610 DRAIN/INJ JOINT/BURSA W/O US: CPT | Mod: PBBFAC | Performed by: NURSE PRACTITIONER

## 2023-09-05 PROCEDURE — 20610 LARGE JOINT ASPIRATION/INJECTION: L SUBACROMIAL BURSA: ICD-10-PCS | Mod: S$PBB,LT,, | Performed by: NURSE PRACTITIONER

## 2023-09-05 PROCEDURE — 99999PBSHW PR PBB SHADOW TECHNICAL ONLY FILED TO HB: Mod: PBBFAC,,,

## 2023-09-05 RX ORDER — TRIAMCINOLONE ACETONIDE 40 MG/ML
80 INJECTION, SUSPENSION INTRA-ARTICULAR; INTRAMUSCULAR
Status: DISCONTINUED | OUTPATIENT
Start: 2023-09-05 | End: 2023-09-05 | Stop reason: HOSPADM

## 2023-09-05 RX ADMIN — TRIAMCINOLONE ACETONIDE 80 MG: 40 INJECTION, SUSPENSION INTRA-ARTICULAR; INTRAMUSCULAR at 02:09

## 2023-09-05 NOTE — PROGRESS NOTES
HPI:   Madelaine Palacio is a pleasant 48 y.o. patient who reports to clinic for evaluation of left shoulder pain reports she has had left shoulder pain for months, worse recently.  She denies any recent injuries.  Reports it is painful to lift her 2-year-old.  Certain motions cause pain in her shoulder.  She has also developed some weakness in the shoulder..     Injury onset and description:   Patient's occupation:   This is not a work related injury.   This injury has been non-responsive to conservative care. The pain is worse with repetitive use, and strenuous activity is very difficult.  her pain improves with rest.   PAST MEDICAL HISTORY:   Past Medical History:   Diagnosis Date    Anemia, unspecified     Asthma     Hypertension     Uterine fibroid      PAST SURGICAL HISTORY:   Past Surgical History:   Procedure Laterality Date    left wrist fracture      right wrist skin graft       MEDICATIONS:    Current Outpatient Medications:     albuterol (PROVENTIL/VENTOLIN HFA) 90 mcg/actuation inhaler, Inhale 2 puffs into the lungs every 6 (six) hours as needed for Wheezing. Rescue, Disp: 18 g, Rfl: 2    amLODIPine (NORVASC) 5 MG tablet, Take 1 tablet (5 mg total) by mouth once daily., Disp: 90 tablet, Rfl: 3    ferrous sulfate 324 mg (65 mg iron) TbEC, Take 1 tablet (324 mg total) by mouth 2 (two) times daily., Disp: 30 tablet, Rfl: 2    naproxen (NAPROSYN) 500 MG tablet, Take 1 tablet (500 mg total) by mouth 2 (two) times daily as needed (shoulder pain with meals)., Disp: 30 tablet, Rfl: 0  ALLERGIES:   Review of patient's allergies indicates:  No Known Allergies      PHYSICAL EXAM:  VITAL SIGNS: LMP 08/14/2023 (Exact Date)   General: Well-developed well-nourished 48 y.o. femalein no acute distress;Cardiovascular: Regular rhythm by palpation of distal pulse, normal color and temperature, no concerning varicosities on symptomatic side Lungs: No labored breathing or wheezing appreciated Neuro: Alert and oriented ×3  Psychiatric: well oriented to person, place and time, demonstrates normal mood and affect Skin: No rashes, lesions or ulcers, normal temperature, turgor, and texture on uninvolved extremity    General    Nursing note and vitals reviewed.  Constitutional: She is oriented to person, place, and time. She appears well-developed and well-nourished.   HENT:   Head: Normocephalic and atraumatic.   Nose: Nose normal.   Eyes: EOM are normal. Pupils are equal, round, and reactive to light.   Neck: Neck supple.   Cardiovascular:  Normal rate and intact distal pulses.            Pulmonary/Chest: Effort normal. No respiratory distress. She exhibits no tenderness.   Abdominal: Soft. She exhibits no distension. There is no abdominal tenderness.   Neurological: She is alert and oriented to person, place, and time. She has normal reflexes.   Psychiatric: She has a normal mood and affect. Her behavior is normal. Judgment and thought content normal.             Left Shoulder Exam     Tenderness   The patient is tender to palpation of the supraspinatus and biceps tendon.    Crepitus   The patient has crepitus of the acromion    Range of Motion   External Rotation 0 degrees:  normal   Internal rotation 0 degrees:  normal   Internal rotation 90 degrees:  normal     Tests & Signs   Apprehension: positive  Rotator Cuff Painful Arc/Range: moderate  Anterior Drawer Test: 2+  Relocation 90 degrees: positive  Jerk Test: positive       IMAGING:  X-Ray Shoulder 2 or More Views Left    Result Date: 8/8/2023  EXAMINATION: XR SHOULDER COMPLETE 2 OR MORE VIEWS LEFT CLINICAL HISTORY: Pain in left shoulder TECHNIQUE: Two or three views of the left shoulder were performed. COMPARISON: None FINDINGS: No fracture.  No dislocation.  Mild degenerative changes of the acromioclavicular joint with small osteophyte formation.     Mild degenerative changes. Electronically signed by: Jonathan Berg Date:    08/08/2023 Time:    16:50        ASSESSMENT:      ICD-10-CM  ICD-9-CM   1. Chronic left shoulder pain  M25.512 719.41    G89.29 338.29   2. Osteoarthritis, unspecified osteoarthritis type, unspecified site  M19.90 715.90   3. Osteophyte determined by x-ray  M25.70 726.91       PLAN:     -Findings and treatment options were discussed with the patient  -All questions answered  HEP  Left shoulder subacromial injection today.  Continue naproxen twice a day.  Return to clinic in 6 weeks for recheck.    There are no Patient Instructions on file for this visit.  No orders of the defined types were placed in this encounter.    Large Joint Aspiration/Injection: L subacromial bursa    Date/Time: 9/5/2023 2:00 PM    Performed by: Kitty Prater FNP  Authorized by: Kitty Prater FNP    Consent Done?:  Yes (Verbal)  Indications:  Pain  Site marked: the procedure site was marked    Local anesthetic:  Bupivacaine 0.25% without epinephrine    Details:  Needle Size:  22 G  Approach:  Posterior  Location:  Shoulder  Site:  L subacromial bursa  Medications:  80 mg triamcinolone acetonide 40 mg/mL  Patient tolerance:  Patient tolerated the procedure well with no immediate complications

## 2023-09-18 ENCOUNTER — PROCEDURE VISIT (OUTPATIENT)
Dept: OBSTETRICS AND GYNECOLOGY | Facility: CLINIC | Age: 48
End: 2023-09-18
Payer: MEDICAID

## 2023-09-18 VITALS — DIASTOLIC BLOOD PRESSURE: 72 MMHG | SYSTOLIC BLOOD PRESSURE: 134 MMHG

## 2023-09-18 DIAGNOSIS — N92.4 EXCESSIVE BLEEDING IN PREMENOPAUSAL PERIOD: Primary | ICD-10-CM

## 2023-09-18 PROCEDURE — 58100 ENDOMETRIAL BIOPSY: ICD-10-PCS | Mod: S$PBB,,, | Performed by: OBSTETRICS & GYNECOLOGY

## 2023-09-18 PROCEDURE — 99499 NO LOS: ICD-10-PCS | Mod: S$PBB,,, | Performed by: OBSTETRICS & GYNECOLOGY

## 2023-09-18 PROCEDURE — 99499 UNLISTED E&M SERVICE: CPT | Mod: S$PBB,,, | Performed by: OBSTETRICS & GYNECOLOGY

## 2023-09-18 PROCEDURE — 88342 SURGICAL PATHOLOGY: ICD-10-PCS | Mod: 26,,, | Performed by: PATHOLOGY

## 2023-09-18 PROCEDURE — 58100 BIOPSY OF UTERUS LINING: CPT | Mod: PBBFAC | Performed by: OBSTETRICS & GYNECOLOGY

## 2023-09-18 PROCEDURE — 88342 IMHCHEM/IMCYTCHM 1ST ANTB: CPT | Mod: 26,,, | Performed by: PATHOLOGY

## 2023-09-18 PROCEDURE — 88341 IMHCHEM/IMCYTCHM EA ADD ANTB: CPT | Mod: 26,,, | Performed by: PATHOLOGY

## 2023-09-18 PROCEDURE — 88305 TISSUE EXAM BY PATHOLOGIST: CPT | Mod: 26,,, | Performed by: PATHOLOGY

## 2023-09-18 PROCEDURE — 88305 TISSUE EXAM BY PATHOLOGIST: CPT | Mod: TC,SUR | Performed by: OBSTETRICS & GYNECOLOGY

## 2023-09-18 PROCEDURE — 88341 PR IHC OR ICC EACH ADD'L SINGLE ANTIBODY  STAINPR: ICD-10-PCS | Mod: 26,,, | Performed by: PATHOLOGY

## 2023-09-18 PROCEDURE — 88305 SURGICAL PATHOLOGY: ICD-10-PCS | Mod: 26,,, | Performed by: PATHOLOGY

## 2023-09-18 RX ORDER — MEDROXYPROGESTERONE ACETATE 10 MG/1
TABLET ORAL
Qty: 30 TABLET | Refills: 0 | Status: SHIPPED | OUTPATIENT
Start: 2023-09-18

## 2023-09-18 NOTE — PROCEDURES
Endometrial biopsy    Date/Time: 9/18/2023 3:30 PM    Performed by: Atul Almanzar MD  Authorized by: Atul Almanzar MD    Consent:     Consent obtained:  Written    Consent given by:  Patient    Patient questions answered: yes      Patient agrees, verbalizes understanding, and wants to proceed: yes      Educational handouts given: no      Instructions and paperwork completed: yes    Indication:     Indications: Menorrhagia    Pre-procedure:     Pre-procedure timeout performed: yes    Procedure:     Procedure: endometrial biopsy with Pipelle      Cervix cleaned and prepped: yes      A paracervical block was performed: no      An intracervical block was performed: yes      The cervix was dilated: yes      Uterus sounded: yes      Uterus sound depth (cm):  9    Curettes used:  1    Specimen collected: specimen collected and sent to pathology      Patient tolerated procedure well with no complications: yes    Comments:     Procedure comments:  History of uterine leiomyoma documented by ultrasound.  Uterus 15 week size irregular contour on bimanual exam.    Cervix was visualized prepped with Betadine block with lidocaine tenaculum applied.  Cervix was dilated with cervical dilator followed by endometrial biopsy using Endo cell sampler.  Uterus and cervix sounded to approximally 9 cm.  Moderate amount of curettings obtained.

## 2023-09-18 NOTE — PATIENT INSTRUCTIONS
Discussed follow-up pathology report.    Begin cyclic Provera 10 mg a day for 10 days per month.    We have tentatively scheduled her for hysterectomy mid November.      Follow-up appointment 1 week prior to scheduled surgery.      Discussed anemia she will begin iron supplements.      Notify me if any worsening bleeding occurs.

## 2023-09-21 LAB
DHEA SERPL-MCNC: NORMAL
ESTROGEN SERPL-MCNC: NORMAL PG/ML
INSULIN SERPL-ACNC: NORMAL U[IU]/ML
LAB AP CLINICAL INFORMATION: NORMAL
LAB AP GROSS DESCRIPTION: NORMAL
LAB AP LABORATORY NOTES: NORMAL
T3RU NFR SERPL: NORMAL %

## 2023-11-08 ENCOUNTER — TELEPHONE (OUTPATIENT)
Dept: OBSTETRICS AND GYNECOLOGY | Facility: CLINIC | Age: 48
End: 2023-11-08
Payer: MEDICAID

## 2023-11-08 NOTE — TELEPHONE ENCOUNTER
----- Message from Laureen Calderon sent at 11/8/2023 11:50 AM CST -----  Regarding: Bleeding  Patient is still bleeding since she seen you 9/18, said  bc didn't work for her she didn't take bc like she should, want to know if there a shot she can get. Please call her @ [phone]

## 2023-11-09 ENCOUNTER — OFFICE VISIT (OUTPATIENT)
Dept: OBSTETRICS AND GYNECOLOGY | Facility: CLINIC | Age: 48
End: 2023-11-09
Payer: MEDICAID

## 2023-11-09 VITALS — DIASTOLIC BLOOD PRESSURE: 80 MMHG | SYSTOLIC BLOOD PRESSURE: 138 MMHG

## 2023-11-09 DIAGNOSIS — N92.4 EXCESSIVE BLEEDING IN PREMENOPAUSAL PERIOD: Primary | ICD-10-CM

## 2023-11-09 DIAGNOSIS — D25.9 UTERINE LEIOMYOMA, UNSPECIFIED LOCATION: Primary | ICD-10-CM

## 2023-11-09 PROCEDURE — 3079F PR MOST RECENT DIASTOLIC BLOOD PRESSURE 80-89 MM HG: ICD-10-PCS | Mod: CPTII,,, | Performed by: OBSTETRICS & GYNECOLOGY

## 2023-11-09 PROCEDURE — 3075F SYST BP GE 130 - 139MM HG: CPT | Mod: CPTII,,, | Performed by: OBSTETRICS & GYNECOLOGY

## 2023-11-09 PROCEDURE — 99213 PR OFFICE/OUTPT VISIT, EST, LEVL III, 20-29 MIN: ICD-10-PCS | Mod: S$PBB,25,, | Performed by: OBSTETRICS & GYNECOLOGY

## 2023-11-09 PROCEDURE — 99999PBSHW PR PBB SHADOW TECHNICAL ONLY FILED TO HB: Mod: PBBFAC,,,

## 2023-11-09 PROCEDURE — 3079F DIAST BP 80-89 MM HG: CPT | Mod: CPTII,,, | Performed by: OBSTETRICS & GYNECOLOGY

## 2023-11-09 PROCEDURE — 96372 THER/PROPH/DIAG INJ SC/IM: CPT | Mod: PBBFAC | Performed by: OBSTETRICS & GYNECOLOGY

## 2023-11-09 PROCEDURE — 99213 OFFICE O/P EST LOW 20 MIN: CPT | Mod: S$PBB,25,, | Performed by: OBSTETRICS & GYNECOLOGY

## 2023-11-09 PROCEDURE — 99999PBSHW PR PBB SHADOW TECHNICAL ONLY FILED TO HB: ICD-10-PCS | Mod: PBBFAC,,,

## 2023-11-09 PROCEDURE — 99213 OFFICE O/P EST LOW 20 MIN: CPT | Mod: PBBFAC,25 | Performed by: OBSTETRICS & GYNECOLOGY

## 2023-11-09 PROCEDURE — 3075F PR MOST RECENT SYSTOLIC BLOOD PRESS GE 130-139MM HG: ICD-10-PCS | Mod: CPTII,,, | Performed by: OBSTETRICS & GYNECOLOGY

## 2023-11-09 RX ORDER — MEDROXYPROGESTERONE ACETATE 150 MG/ML
150 INJECTION, SUSPENSION INTRAMUSCULAR ONCE
Status: COMPLETED | OUTPATIENT
Start: 2023-11-09 | End: 2023-11-09

## 2023-11-09 RX ADMIN — MEDROXYPROGESTERONE ACETATE 150 MG: 150 INJECTION, SUSPENSION INTRAMUSCULAR at 03:11

## 2023-11-09 NOTE — PROGRESS NOTES
Subjective:       Patient ID: Madelaine Palacio is a 48 y.o. female.    Chief Complaint: Dysfunctional Uterine Bleeding (Pt presents c/o irregular menses. )    Patient presents complaining of some persistent bleeding    She elected not to take oral contraceptives as directed.      No weakness or dizziness.    After long detailed discussion in the office we have decided to check CBC and hCG.  If hCG is is negative and no significant anemia we will simply give Depo-Provera.      She understands irregular bleeding may continue even with Depo-Provera injection.    We discussed alternative management of intrauterine artery embolization versus ablation.  However my recommendation is to proceed with hysterectomy.  After further    Follow-up lab revealed hCG negative.  Hematocrit 31.4% which is 1 point rater than 2 months ago.    She was encouraged to continue multivitamins with iron supplements.           Review of Systems      Objective:      Physical Exam    Assessment:       1. Uterine leiomyoma, unspecified location        Plan:       There are no Patient Instructions on file for this visit.

## 2023-11-09 NOTE — PATIENT INSTRUCTIONS
Depo-Provera 150 mg given IM.      However I discussed that this may not completely stop menstrual flows.    She will continue multivitamins with iron supplements.    She will keep follow-up appointment for preoperative exam in January 2024

## 2024-01-09 DIAGNOSIS — Z01.818 PRE-OP EXAMINATION: Primary | ICD-10-CM

## 2024-01-29 ENCOUNTER — TELEPHONE (OUTPATIENT)
Dept: OBSTETRICS AND GYNECOLOGY | Facility: CLINIC | Age: 49
End: 2024-01-29
Payer: MEDICAID

## 2024-01-29 NOTE — TELEPHONE ENCOUNTER
----- Message from Suzy Villeda sent at 2024 12:51 PM CST -----  Pt wants nurse to call at [phone]. I told her it may be Monday.       Verified , Patient was calling due to letter she received from insurance company stating her surgery was denied. Her insurance is [employer] and per pre cert department Dr. Almanzar is not in network and she stated they were working on getting him added. I will be calling back to pre cert to see if he has been added. Patient voiced understanding and agreed.

## 2024-03-04 ENCOUNTER — OFFICE VISIT (OUTPATIENT)
Dept: FAMILY MEDICINE | Facility: CLINIC | Age: 49
End: 2024-03-04
Payer: MEDICAID

## 2024-03-04 VITALS
SYSTOLIC BLOOD PRESSURE: 164 MMHG | TEMPERATURE: 98 F | HEIGHT: 66 IN | RESPIRATION RATE: 17 BRPM | BODY MASS INDEX: 30.96 KG/M2 | WEIGHT: 192.63 LBS | HEART RATE: 72 BPM | OXYGEN SATURATION: 100 % | DIASTOLIC BLOOD PRESSURE: 100 MMHG

## 2024-03-04 DIAGNOSIS — H00.015 HORDEOLUM EXTERNUM OF LEFT LOWER EYELID: Primary | ICD-10-CM

## 2024-03-04 DIAGNOSIS — I10 HYPERTENSION, UNSPECIFIED TYPE: ICD-10-CM

## 2024-03-04 DIAGNOSIS — N92.1 MENORRHAGIA WITH IRREGULAR CYCLE: ICD-10-CM

## 2024-03-04 DIAGNOSIS — Z12.11 SCREENING FOR COLON CANCER: ICD-10-CM

## 2024-03-04 DIAGNOSIS — Z12.39 ENCOUNTER FOR SCREENING FOR MALIGNANT NEOPLASM OF BREAST, UNSPECIFIED SCREENING MODALITY: ICD-10-CM

## 2024-03-04 PROCEDURE — 3008F BODY MASS INDEX DOCD: CPT | Mod: CPTII,,,

## 2024-03-04 PROCEDURE — 3077F SYST BP >= 140 MM HG: CPT | Mod: CPTII,,,

## 2024-03-04 PROCEDURE — 1160F RVW MEDS BY RX/DR IN RCRD: CPT | Mod: CPTII,,,

## 2024-03-04 PROCEDURE — 3080F DIAST BP >= 90 MM HG: CPT | Mod: CPTII,,,

## 2024-03-04 PROCEDURE — 1159F MED LIST DOCD IN RCRD: CPT | Mod: CPTII,,,

## 2024-03-04 PROCEDURE — 99213 OFFICE O/P EST LOW 20 MIN: CPT | Mod: ,,,

## 2024-03-04 RX ORDER — AMLODIPINE BESYLATE 5 MG/1
5 TABLET ORAL DAILY
Qty: 90 TABLET | Refills: 3 | Status: SHIPPED | OUTPATIENT
Start: 2024-03-04 | End: 2025-02-27

## 2024-03-04 RX ORDER — ERYTHROMYCIN 5 MG/G
OINTMENT OPHTHALMIC
Qty: 1 G | Refills: 0 | Status: SHIPPED | OUTPATIENT
Start: 2024-03-04 | End: 2024-04-18

## 2024-03-05 ENCOUNTER — CLINICAL SUPPORT (OUTPATIENT)
Dept: CARDIOLOGY | Facility: CLINIC | Age: 49
End: 2024-03-05
Payer: MEDICAID

## 2024-03-05 ENCOUNTER — TELEPHONE (OUTPATIENT)
Dept: OBSTETRICS AND GYNECOLOGY | Facility: CLINIC | Age: 49
End: 2024-03-05
Payer: MEDICAID

## 2024-03-05 ENCOUNTER — HOSPITAL ENCOUNTER (OUTPATIENT)
Dept: RADIOLOGY | Facility: HOSPITAL | Age: 49
Discharge: HOME | End: 2024-03-05
Attending: OBSTETRICS & GYNECOLOGY
Payer: MEDICAID

## 2024-03-05 ENCOUNTER — OFFICE VISIT (OUTPATIENT)
Dept: OBSTETRICS AND GYNECOLOGY | Facility: CLINIC | Age: 49
End: 2024-03-05
Payer: MEDICAID

## 2024-03-05 VITALS
HEIGHT: 65 IN | HEART RATE: 82 BPM | WEIGHT: 191.19 LBS | SYSTOLIC BLOOD PRESSURE: 130 MMHG | BODY MASS INDEX: 31.85 KG/M2 | DIASTOLIC BLOOD PRESSURE: 72 MMHG | TEMPERATURE: 98 F

## 2024-03-05 DIAGNOSIS — Z12.11 COLON CANCER SCREENING: Primary | ICD-10-CM

## 2024-03-05 DIAGNOSIS — D25.1 INTRAMURAL LEIOMYOMA OF UTERUS: Primary | ICD-10-CM

## 2024-03-05 DIAGNOSIS — Z01.818 PRE-OP EXAMINATION: ICD-10-CM

## 2024-03-05 PROCEDURE — 93005 ELECTROCARDIOGRAM TRACING: CPT | Mod: PBBFAC | Performed by: INTERNAL MEDICINE

## 2024-03-05 PROCEDURE — 71046 X-RAY EXAM CHEST 2 VIEWS: CPT | Mod: TC

## 2024-03-05 PROCEDURE — 99212 OFFICE O/P EST SF 10 MIN: CPT | Mod: PBBFAC,25

## 2024-03-05 PROCEDURE — 71046 X-RAY EXAM CHEST 2 VIEWS: CPT | Mod: 26,,, | Performed by: RADIOLOGY

## 2024-03-05 PROCEDURE — 99499 UNLISTED E&M SERVICE: CPT | Mod: S$PBB,,, | Performed by: OBSTETRICS & GYNECOLOGY

## 2024-03-05 PROCEDURE — 93010 ELECTROCARDIOGRAM REPORT: CPT | Mod: S$PBB,,, | Performed by: INTERNAL MEDICINE

## 2024-03-05 RX ORDER — POLYETHYLENE GLYCOL 3350, SODIUM SULFATE ANHYDROUS, SODIUM BICARBONATE, SODIUM CHLORIDE, POTASSIUM CHLORIDE 236; 22.74; 6.74; 5.86; 2.97 G/4L; G/4L; G/4L; G/4L; G/4L
4 POWDER, FOR SOLUTION ORAL ONCE
Qty: 4000 ML | Refills: 0 | Status: SHIPPED | OUTPATIENT
Start: 2024-03-05 | End: 2024-03-05

## 2024-03-05 NOTE — TELEPHONE ENCOUNTER
Patient left my office prior to being seen on her preoperative examination.    I called her on the phone and asked her did she want to postpone her surgery.    She stated she could not wait for her preoperative appointment today that she was busy.  I stated since she did not have her preoperative appointment today I could not proceed with her surgery on Monday.    Offered to reschedule her surgery later.  She agreed to this.    Surgery to be rescheduled.

## 2024-03-08 ENCOUNTER — OFFICE VISIT (OUTPATIENT)
Dept: FAMILY MEDICINE | Facility: CLINIC | Age: 49
End: 2024-03-08
Payer: MEDICAID

## 2024-03-08 VITALS
HEIGHT: 65 IN | BODY MASS INDEX: 31.63 KG/M2 | SYSTOLIC BLOOD PRESSURE: 137 MMHG | RESPIRATION RATE: 17 BRPM | HEART RATE: 79 BPM | TEMPERATURE: 99 F | WEIGHT: 189.81 LBS | DIASTOLIC BLOOD PRESSURE: 85 MMHG | OXYGEN SATURATION: 100 %

## 2024-03-08 DIAGNOSIS — Z13.220 SCREENING FOR LIPID DISORDERS: ICD-10-CM

## 2024-03-08 DIAGNOSIS — Z86.2 HISTORY OF ANEMIA: ICD-10-CM

## 2024-03-08 DIAGNOSIS — Z00.00 WELLNESS EXAMINATION: ICD-10-CM

## 2024-03-08 DIAGNOSIS — H00.015 HORDEOLUM EXTERNUM OF LEFT LOWER EYELID: ICD-10-CM

## 2024-03-08 DIAGNOSIS — Z72.51 HIGH RISK HETEROSEXUAL BEHAVIOR: ICD-10-CM

## 2024-03-08 DIAGNOSIS — Z11.3 ENCOUNTER FOR SCREENING FOR INFECTIONS WITH A PREDOMINANTLY SEXUAL MODE OF TRANSMISSION: Primary | ICD-10-CM

## 2024-03-08 DIAGNOSIS — I10 PRIMARY HYPERTENSION: ICD-10-CM

## 2024-03-08 LAB
CANDIDA SPECIES: NEGATIVE
CHOLEST SERPL-MCNC: 169 MG/DL (ref 0–200)
CHOLEST/HDLC SERPL: 1.8 {RATIO}
GARDNERELLA: POSITIVE
GLUCOSE SERPL-MCNC: 127 MG/DL (ref 70–110)
HDLC SERPL-MCNC: 92 MG/DL (ref 40–60)
LDLC SERPL CALC-MCNC: 66 MG/DL
NONHDLC SERPL-MCNC: 77 MG/DL
TRICHOMONAS: NEGATIVE
TRIGL SERPL-MCNC: 57 MG/DL (ref 35–150)
VLDLC SERPL-MCNC: 11 MG/DL

## 2024-03-08 PROCEDURE — 3008F BODY MASS INDEX DOCD: CPT | Mod: CPTII,,,

## 2024-03-08 PROCEDURE — 80061 LIPID PANEL: CPT | Mod: ,,, | Performed by: CLINICAL MEDICAL LABORATORY

## 2024-03-08 PROCEDURE — 87591 N.GONORRHOEAE DNA AMP PROB: CPT | Mod: ,,, | Performed by: CLINICAL MEDICAL LABORATORY

## 2024-03-08 PROCEDURE — 99213 OFFICE O/P EST LOW 20 MIN: CPT | Mod: ,,,

## 2024-03-08 PROCEDURE — 87491 CHLMYD TRACH DNA AMP PROBE: CPT | Mod: ,,, | Performed by: CLINICAL MEDICAL LABORATORY

## 2024-03-08 PROCEDURE — 1159F MED LIST DOCD IN RCRD: CPT | Mod: CPTII,,,

## 2024-03-08 PROCEDURE — 87480 CANDIDA DNA DIR PROBE: CPT | Mod: ,,, | Performed by: CLINICAL MEDICAL LABORATORY

## 2024-03-08 PROCEDURE — 3079F DIAST BP 80-89 MM HG: CPT | Mod: CPTII,,,

## 2024-03-08 PROCEDURE — 1160F RVW MEDS BY RX/DR IN RCRD: CPT | Mod: CPTII,,,

## 2024-03-08 PROCEDURE — 87510 GARDNER VAG DNA DIR PROBE: CPT | Mod: ,,, | Performed by: CLINICAL MEDICAL LABORATORY

## 2024-03-08 PROCEDURE — 87660 TRICHOMONAS VAGIN DIR PROBE: CPT | Mod: ,,, | Performed by: CLINICAL MEDICAL LABORATORY

## 2024-03-08 PROCEDURE — 3075F SYST BP GE 130 - 139MM HG: CPT | Mod: CPTII,,,

## 2024-03-08 NOTE — PROGRESS NOTES
Patient left my office prior to being seen.  She had presented for her preoperative examination.    However she decided not to wait for me.      She was later called and she is decided to reschedule her surgery for May 2024.  She was given a preoperative appointment.

## 2024-03-08 NOTE — PROGRESS NOTES
PATIENT NAME: Madelaine Palacio  : 1975  DATE: 3/8/24  MRN: [id number]      Reason for Visit / Chief Complaint: Annual Exam (Room B,, would like STD testing)     Update PCP  Update Chief Complaint         History of Present Illness / Problem Focused Workflow     Madelaine Palacio presents to the clinic with Annual Exam (Room B,, would like STD testing)     Patient presents to the clinic for wellness exam. She had an endometrial biopsy in 2023 that revealed Inflamed and partially necrotic endometrium and abundant associated blood. Dr. Almanzar has recommended hysterectomy. Patient has been reluctant and has delayed surgery before. She has not been taking iron tablets d/t GI side effects (nausea and constipation). She has not taken anything for constipation.  Last pap was 2023. Last mammogram was 2021 and has one scheduled for 6/3/2024. Last colonoscopy was never and is scheduled for 2024. Patient denies family history of colon, breast, cervical cancer. . Exercises never. Water intake is poor. Diet is poor. Denies snoring/waking up at night.       Review of Systems     @Review of Systems   Constitutional:  Positive for fatigue.   HENT:  Negative for dental problem, facial swelling, hearing loss, trouble swallowing, voice change and goiter.    Eyes:  Negative for visual disturbance.   Respiratory:  Negative for chest tightness and shortness of breath.    Cardiovascular:  Negative for chest pain and palpitations.   Gastrointestinal:  Positive for constipation. Negative for abdominal pain, diarrhea, nausea and vomiting.   Endocrine: Negative for cold intolerance, heat intolerance, polydipsia, polyphagia and polyuria.   Genitourinary:  Negative for decreased urine volume, difficulty urinating, flank pain, menstrual problem, pelvic pain, vaginal bleeding and vaginal discharge.   Musculoskeletal:  Negative for arthralgias, back pain and myalgias.   Integumentary:  Negative for color change, pallor,  rash, wound, mole/lesion, breast mass, breast discharge and breast tenderness.   Neurological:  Negative for dizziness, weakness, light-headedness, numbness and headaches.   Psychiatric/Behavioral:  Negative for sleep disturbance. The patient is not nervous/anxious.    Breast: Negative for mass and tenderness      Medical / Social / Family History     Past Medical History:   Diagnosis Date    Anemia, unspecified     Asthma     Hypertension     Uterine fibroid        Past Surgical History:   Procedure Laterality Date    left wrist fracture      right wrist skin graft         Social History  Ms.  reports that she has quit smoking. She has never used smokeless tobacco. She reports current alcohol use. She reports that she does not currently use drugs.    Family History  Ms.'s family history includes Diabetes in her mother; Hypertension in her maternal grandmother and mother.    Medications and Allergies     Medications  Outpatient Medications Marked as Taking for the 3/8/24 encounter (Office Visit) with Francisca Plata FNP-C   Medication Sig Dispense Refill    amLODIPine (NORVASC) 5 MG tablet Take 1 tablet (5 mg total) by mouth once daily. 90 tablet 3    erythromycin (ROMYCIN) ophthalmic ointment Place 1 cm ribbon to affected eye(s) 4x day for 5 days 1 g 0       Allergies  Review of patient's allergies indicates:  No Known Allergies    Physical Examination     Vitals:    03/08/24 0855   BP: 137/85   Pulse: 79   Resp: 17   Temp: 98.6 °F (37 °C)     Physical Exam  Vitals and nursing note reviewed.   Constitutional:       Appearance: Normal appearance. She is normal weight.   HENT:      Head: Normocephalic.      Right Ear: External ear normal.      Left Ear: External ear normal.      Nose: Nose normal.      Mouth/Throat:      Mouth: Mucous membranes are moist.      Pharynx: Oropharynx is clear.   Eyes:      Extraocular Movements: Extraocular movements intact.      Conjunctiva/sclera: Conjunctivae normal.       Pupils: Pupils are equal, round, and reactive to light.   Cardiovascular:      Rate and Rhythm: Normal rate and regular rhythm.      Pulses: Normal pulses.      Heart sounds: Normal heart sounds.   Pulmonary:      Effort: Pulmonary effort is normal.      Breath sounds: Normal breath sounds.   Abdominal:      General: Abdomen is flat. Bowel sounds are normal.      Palpations: Abdomen is soft.   Musculoskeletal:         General: Normal range of motion.      Cervical back: Normal range of motion and neck supple.   Skin:     General: Skin is warm and dry.      Capillary Refill: Capillary refill takes less than 2 seconds.   Neurological:      General: No focal deficit present.      Mental Status: She is alert and oriented to person, place, and time.   Psychiatric:         Mood and Affect: Mood normal.         Behavior: Behavior normal.         Thought Content: Thought content normal.         Judgment: Judgment normal.             Lab Results   Component Value Date    WBC 7.08 03/05/2024    HGB 7.6 (L) 03/05/2024    HCT 27.0 (L) 03/05/2024    MCV 73.0 (L) 03/05/2024     (H) 03/05/2024          Sodium   Date Value Ref Range Status   03/05/2024 140 136 - 145 mmol/L Final     Potassium   Date Value Ref Range Status   03/05/2024 3.5 3.5 - 5.1 mmol/L Final     Chloride   Date Value Ref Range Status   03/05/2024 110 (H) 98 - 107 mmol/L Final     CO2   Date Value Ref Range Status   03/05/2024 26 21 - 32 mmol/L Final     Glucose   Date Value Ref Range Status   03/05/2024 91 74 - 106 mg/dL Final     BUN   Date Value Ref Range Status   03/05/2024 7 7 - 18 mg/dL Final     Creatinine   Date Value Ref Range Status   03/05/2024 0.59 0.55 - 1.02 mg/dL Final     Calcium   Date Value Ref Range Status   03/05/2024 8.8 8.5 - 10.1 mg/dL Final     Total Protein   Date Value Ref Range Status   03/05/2024 7.3 6.4 - 8.2 g/dL Final     Albumin   Date Value Ref Range Status   03/05/2024 3.5 3.5 - 5.0 g/dL Final     Bilirubin, Total   Date  Value Ref Range Status   03/05/2024 0.4 >0.0 - 1.2 mg/dL Final     Alk Phos   Date Value Ref Range Status   03/05/2024 90 39 - 100 U/L Final     AST   Date Value Ref Range Status   03/05/2024 12 (L) 15 - 37 U/L Final     ALT   Date Value Ref Range Status   03/05/2024 21 13 - 56 U/L Final     Anion Gap   Date Value Ref Range Status   03/05/2024 8 7 - 16 mmol/L Final     eGFR    Date Value Ref Range Status   12/16/2021 134 >=60 mL/min/1.73m² Final      Procedures   Assessment and Plan (including Health Maintenance)      Problem List  Smart Sets  Document Outside HM   :    Plan:  Problem List Items Addressed This Visit          Ophtho    Hordeolum externum of left lower eyelid    Current Assessment & Plan     Improving             Cardiac/Vascular    Hypertension    Overview     BP running low         Current Assessment & Plan     Discussed BP being borderline high on norvasc 5mg  Discussed importance of daily adherence to medication  Discussed  DASH diet, monitoring sodium intake, drinking water, and getting routine cardiovascular exercise.   RTC 3 months            Oncology    History of anemia    Current Assessment & Plan     We discussed starting iron again after noncompliance and importance of speaking with HCP before stopping medications  Suggested taking iron tablets twice daily with orange juice as this may help with nausea and increases absorption  Discussed iron rich diet and sources of iron in foods.           Other Visit Diagnoses       Encounter for screening for infections with a predominantly sexual mode of transmission    -  Primary    Relevant Orders    Chlamydia/GC, PCR (Completed)    Bacterial Vaginosis (Completed)    High risk heterosexual behavior        Relevant Orders    Chlamydia/GC, PCR (Completed)    Bacterial Vaginosis (Completed)    Screening for lipid disorders        Relevant Orders    Lipid Panel (Completed)    Wellness examination        Relevant Orders    POCT glucose  (Completed)            Health Maintenance Topics with due status: Not Due       Topic Last Completion Date    Hemoglobin A1c (Diabetic Prevention Screening) 12/16/2021    Cervical Cancer Screening 01/24/2023    Lipid Panel 03/08/2024       Future Appointments   Date Time Provider Department Center   3/15/2024  1:00 PM [facility]   5/7/2024  7:30 AM Atul Almanzar MD [facility]   5/23/2024  9:45 AM Atul lAmanzar MD [facility]   6/3/2024  2:00 PM [facility]   6/10/2024  8:20 AM Francisca Plata FNP-C [facility]   7/29/2024 10:00 AM [facility] GI ROOM 01 [facility]           Follow up in about 3 months (around 6/8/2024) for BP and f/u.     Signature:  GONSALO PIMENTEL        Date of encounter: 3/8/24

## 2024-03-08 NOTE — PATIENT INSTRUCTIONS
Start taking two (10mg total) of your amlodipine once daily in the mornings.   Take iron with orange juice  Take 1 cap of miralax daily for constipation  Keep follow up appointments

## 2024-03-09 LAB
CHLAMYDIA BY PCR: NEGATIVE
N. GONORRHOEAE (GC) BY PCR: NEGATIVE

## 2024-03-11 LAB
OHS QRS DURATION: 84 MS
OHS QTC CALCULATION: 444 MS

## 2024-03-11 NOTE — ASSESSMENT & PLAN NOTE
Discussed BP being borderline high on norvasc 5mg  Discussed importance of daily adherence to medication  Discussed  DASH diet, monitoring sodium intake, drinking water, and getting routine cardiovascular exercise.   RTC 3 months

## 2024-03-11 NOTE — ASSESSMENT & PLAN NOTE
We discussed starting iron again after noncompliance and importance of speaking with HCP before stopping medications  Suggested taking iron tablets twice daily with orange juice as this may help with nausea and increases absorption  Discussed iron rich diet and sources of iron in foods.

## 2024-03-12 NOTE — ASSESSMENT & PLAN NOTE
Stop previous home treatment as these are not safe  Use erythromycin ointment as directed  Warm moist compresses  Wash hands before and after touching eye and avoid touching eye.   RTC if there is worsening or no improvement.

## 2024-03-12 NOTE — PROGRESS NOTES
PATIENT NAME: Madelaine Palacio  : 1975  DATE: 3/4/24  MRN: [id number]          Reason for Visit / Chief Complaint: Belepharitis (Patient has been swollen and leaking since Thursday)       Update PCP  Update Chief Complaint         History of Present Illness / Problem Focused Workflow     Madelaine Palcaio presents to the clinic with Belepharitis (Patient has been swollen and leaking since Thursday)     Left lower lid swelling and tender. Started last Thursday. . She denies injury. No vision loss/disturbance. Has been using witch hazel, peroxide, and alcohol to infected area. Advised against this.     Review of Systems     @Review of Systems   Constitutional:  Negative for chills and fever.   HENT:  Negative for facial swelling and sore throat.    Eyes:  Positive for pain and redness. Negative for photophobia, discharge, itching, visual disturbance and eye dryness.   Respiratory:  Negative for chest tightness and shortness of breath.    Genitourinary:  Negative for decreased urine volume.   Musculoskeletal:  Negative for neck pain and neck stiffness.   Neurological:  Negative for headaches.     Medical / Social / Family History     Past Medical History:   Diagnosis Date    Anemia, unspecified     Asthma     Hypertension     Uterine fibroid        Past Surgical History:   Procedure Laterality Date    left wrist fracture      right wrist skin graft         Social History  Ms.  reports that she has quit smoking. She has never used smokeless tobacco. She reports current alcohol use. She reports that she does not currently use drugs.    Family History  Ms.'s family history includes Diabetes in her mother; Hypertension in her maternal grandmother and mother.    Medications and Allergies     Medications  Outpatient Medications Marked as Taking for the 3/4/24 encounter (Office Visit) with Francisca Plata FNP-C   Medication Sig Dispense Refill    albuterol (PROVENTIL/VENTOLIN HFA) 90 mcg/actuation inhaler Inhale 2  puffs into the lungs every 6 (six) hours as needed for Wheezing. Rescue 18 g 2    ferrous sulfate 324 mg (65 mg iron) TbEC Take 1 tablet (324 mg total) by mouth 2 (two) times daily. (Patient not taking: Reported on 3/8/2024) 30 tablet 2       Allergies  Review of patient's allergies indicates:  No Known Allergies    Physical Examination     Vitals:    03/04/24 1026   BP: (!) 164/100   Pulse:    Resp:    Temp:      Physical Exam  Constitutional:       Appearance: Normal appearance. She is normal weight.   HENT:      Head: Normocephalic and atraumatic.      Nose: Nose normal.      Mouth/Throat:      Mouth: Mucous membranes are moist.      Pharynx: Oropharynx is clear.   Eyes:      General:         Right eye: No foreign body, discharge or hordeolum.         Left eye: Hordeolum present.No foreign body or discharge.      Extraocular Movements: Extraocular movements intact.      Conjunctiva/sclera:      Right eye: Right conjunctiva is not injected. No chemosis, exudate or hemorrhage.     Left eye: Left conjunctiva is injected. No chemosis, exudate or hemorrhage.     Pupils: Pupils are equal, round, and reactive to light.   Cardiovascular:      Rate and Rhythm: Normal rate and regular rhythm.      Pulses: Normal pulses.      Heart sounds: Normal heart sounds.   Pulmonary:      Effort: Pulmonary effort is normal.      Breath sounds: Normal breath sounds.   Abdominal:      General: Abdomen is flat. Bowel sounds are normal.      Palpations: Abdomen is soft.   Musculoskeletal:      Cervical back: Normal range of motion and neck supple.   Skin:     General: Skin is warm and dry.      Capillary Refill: Capillary refill takes less than 2 seconds.   Neurological:      General: No focal deficit present.      Mental Status: She is alert and oriented to person, place, and time.                    Sodium   Date Value Ref Range Status   03/05/2024 140 136 - 145 mmol/L Final     Potassium   Date Value Ref Range Status   03/05/2024 3.5  3.5 - 5.1 mmol/L Final     Chloride   Date Value Ref Range Status   03/05/2024 110 (H) 98 - 107 mmol/L Final     CO2   Date Value Ref Range Status   03/05/2024 26 21 - 32 mmol/L Final     Glucose   Date Value Ref Range Status   03/05/2024 91 74 - 106 mg/dL Final     BUN   Date Value Ref Range Status   03/05/2024 7 7 - 18 mg/dL Final     Creatinine   Date Value Ref Range Status   03/05/2024 0.59 0.55 - 1.02 mg/dL Final     Calcium   Date Value Ref Range Status   03/05/2024 8.8 8.5 - 10.1 mg/dL Final     Total Protein   Date Value Ref Range Status   03/05/2024 7.3 6.4 - 8.2 g/dL Final     Albumin   Date Value Ref Range Status   03/05/2024 3.5 3.5 - 5.0 g/dL Final     Bilirubin, Total   Date Value Ref Range Status   03/05/2024 0.4 >0.0 - 1.2 mg/dL Final     Alk Phos   Date Value Ref Range Status   03/05/2024 90 39 - 100 U/L Final     AST   Date Value Ref Range Status   03/05/2024 12 (L) 15 - 37 U/L Final     ALT   Date Value Ref Range Status   03/05/2024 21 13 - 56 U/L Final     Anion Gap   Date Value Ref Range Status   03/05/2024 8 7 - 16 mmol/L Final     eGFR    Date Value Ref Range Status   12/16/2021 134 >=60 mL/min/1.73m² Final      Procedures   Assessment and Plan (including Health Maintenance)      Problem List  Smart Sets  Document Outside HM   :    Plan:   Problem List Items Addressed This Visit          Ophtho    Hordeolum externum of left lower eyelid - Primary    Current Assessment & Plan     Stop previous home treatment as these are not safe  Use erythromycin ointment as directed  Warm moist compresses  Wash hands before and after touching eye and avoid touching eye.   RTC if there is worsening or no improvement.             Cardiac/Vascular    Hypertension    Overview     BP running low         Relevant Medications    amLODIPine (NORVASC) 5 MG tablet     Other Visit Diagnoses       Menorrhagia with irregular cycle        Relevant Orders    US Pelvis Complete Non OB    Screening for colon  cancer        Relevant Orders    Colonoscopy    Encounter for screening for malignant neoplasm of breast, unspecified screening modality        Relevant Orders    Mammo Digital Screening Bilat            Health Maintenance Topics with due status: Not Due       Topic Last Completion Date    Hemoglobin A1c (Diabetic Prevention Screening) 12/16/2021    Cervical Cancer Screening 01/24/2023    Lipid Panel 03/08/2024       Future Appointments   Date Time Provider Department Center   3/15/2024  1:00 PM [facility]   5/7/2024  7:30 AM Atul Almanzar MD [facility]   5/23/2024  9:45 AM Atul Almanzar MD [facility]   6/3/2024  2:00 PM [facility]   6/10/2024  8:20 AM Francisca Plata FNP-C [facility]   7/29/2024 10:00 AM [facility] GI ROOM 01 [facility]        Health Maintenance Due   Topic Date Due    Pneumococcal Vaccines (Age 0-64) (1 of 2 - PCV) Never done    TETANUS VACCINE  Never done    Colorectal Cancer Screening  Never done    Mammogram  03/16/2022    Influenza Vaccine (1) 09/01/2023    COVID-19 Vaccine (3 - 2023-24 season) 09/01/2023        Follow up if symptoms worsen or fail to improve.     Signature:  GONSALO PIMENTEL        Date of encounter: 3/4/24

## 2024-03-19 ENCOUNTER — TELEPHONE (OUTPATIENT)
Dept: FAMILY MEDICINE | Facility: CLINIC | Age: 49
End: 2024-03-19
Payer: MEDICAID

## 2024-03-19 RX ORDER — METRONIDAZOLE 7.5 MG/G
1 GEL VAGINAL NIGHTLY
Qty: 70 G | Refills: 0 | Status: SHIPPED | OUTPATIENT
Start: 2024-03-19 | End: 2024-03-24

## 2024-03-19 NOTE — TELEPHONE ENCOUNTER
----- Message from GONSALO Ramos sent at 3/19/2024 11:40 AM CDT -----  Metrogel for BV. Other labs okay

## 2024-04-09 ENCOUNTER — OFFICE VISIT (OUTPATIENT)
Dept: FAMILY MEDICINE | Facility: CLINIC | Age: 49
End: 2024-04-09
Payer: MEDICAID

## 2024-04-09 VITALS
HEART RATE: 82 BPM | DIASTOLIC BLOOD PRESSURE: 83 MMHG | WEIGHT: 188.81 LBS | OXYGEN SATURATION: 99 % | SYSTOLIC BLOOD PRESSURE: 119 MMHG | TEMPERATURE: 98 F | HEIGHT: 60 IN | BODY MASS INDEX: 37.07 KG/M2

## 2024-04-09 DIAGNOSIS — J45.20 MILD INTERMITTENT ASTHMA WITHOUT COMPLICATION: ICD-10-CM

## 2024-04-09 DIAGNOSIS — Z11.3 ENCOUNTER FOR SCREENING FOR INFECTIONS WITH A PREDOMINANTLY SEXUAL MODE OF TRANSMISSION: Primary | ICD-10-CM

## 2024-04-09 DIAGNOSIS — J45.909 ASTHMA, UNSPECIFIED ASTHMA SEVERITY, UNSPECIFIED WHETHER COMPLICATED, UNSPECIFIED WHETHER PERSISTENT: ICD-10-CM

## 2024-04-09 DIAGNOSIS — Z86.2 HISTORY OF ANEMIA: ICD-10-CM

## 2024-04-09 DIAGNOSIS — I10 PRIMARY HYPERTENSION: ICD-10-CM

## 2024-04-09 LAB
CANDIDA SPECIES: POSITIVE
GARDNERELLA: POSITIVE
TRICHOMONAS: NEGATIVE

## 2024-04-09 PROCEDURE — 87510 GARDNER VAG DNA DIR PROBE: CPT | Mod: ,,, | Performed by: CLINICAL MEDICAL LABORATORY

## 2024-04-09 PROCEDURE — 87660 TRICHOMONAS VAGIN DIR PROBE: CPT | Mod: ,,, | Performed by: CLINICAL MEDICAL LABORATORY

## 2024-04-09 PROCEDURE — 1159F MED LIST DOCD IN RCRD: CPT | Mod: CPTII,,,

## 2024-04-09 PROCEDURE — 3074F SYST BP LT 130 MM HG: CPT | Mod: CPTII,,,

## 2024-04-09 PROCEDURE — 3079F DIAST BP 80-89 MM HG: CPT | Mod: CPTII,,,

## 2024-04-09 PROCEDURE — 87480 CANDIDA DNA DIR PROBE: CPT | Mod: ,,, | Performed by: CLINICAL MEDICAL LABORATORY

## 2024-04-09 PROCEDURE — 1160F RVW MEDS BY RX/DR IN RCRD: CPT | Mod: CPTII,,,

## 2024-04-09 PROCEDURE — 3008F BODY MASS INDEX DOCD: CPT | Mod: CPTII,,,

## 2024-04-09 PROCEDURE — 99213 OFFICE O/P EST LOW 20 MIN: CPT | Mod: ,,,

## 2024-04-09 RX ORDER — ALBUTEROL SULFATE 90 UG/1
2 AEROSOL, METERED RESPIRATORY (INHALATION) EVERY 6 HOURS PRN
Qty: 18 G | Refills: 2 | Status: SHIPPED | OUTPATIENT
Start: 2024-04-09

## 2024-04-09 RX ORDER — FERROUS SULFATE 324(65)MG
324 TABLET, DELAYED RELEASE (ENTERIC COATED) ORAL 2 TIMES DAILY
Qty: 30 TABLET | Refills: 2 | Status: ON HOLD | OUTPATIENT
Start: 2024-04-09 | End: 2024-05-14 | Stop reason: SDUPTHER

## 2024-04-16 ENCOUNTER — TELEPHONE (OUTPATIENT)
Dept: FAMILY MEDICINE | Facility: CLINIC | Age: 49
End: 2024-04-16
Payer: MEDICAID

## 2024-04-16 RX ORDER — FLUCONAZOLE 150 MG/1
150 TABLET ORAL DAILY
Qty: 2 TABLET | Refills: 0 | Status: SHIPPED | OUTPATIENT
Start: 2024-04-16 | End: 2024-04-18

## 2024-04-16 RX ORDER — METRONIDAZOLE 500 MG/1
500 TABLET ORAL EVERY 12 HOURS
Qty: 14 TABLET | Refills: 0 | Status: ON HOLD | OUTPATIENT
Start: 2024-04-16 | End: 2024-05-13

## 2024-04-16 NOTE — TELEPHONE ENCOUNTER
----- Message from GONSALO Ramos sent at 4/16/2024  3:01 PM CDT -----  I have sent in diflucan and flagyl to treat bv and yeast.

## 2024-04-19 NOTE — ASSESSMENT & PLAN NOTE
Continue norvasc 5mg  DASH diet, 64 oz water per day, routine aerobic exercise  Follow up in 6 months or sooner if needed.

## 2024-04-19 NOTE — PROGRESS NOTES
PATIENT NAME: Madelaine Palacio  : 1975  DATE: 24  MRN: [id number]      Reason for Visit / Chief Complaint: Follow-up (Exam C)       Update PCP  Update Chief Complaint         History of Present Illness / Problem Focused Workflow     Madelaine Palacio presents to the clinic with Follow-up (Exam C)     Has been more compliant with medication since last visit. Lifestyle changes have not been made. Discussed need for lifestyle changes including diet, exercise, water intake and avoidance of tobacco, nicotine, illicit drugs, and etoh.     Review of Systems     @Review of Systems   Constitutional:  Negative for chills, fatigue and fever.   Eyes:  Negative for visual disturbance.   Respiratory:  Negative for chest tightness and shortness of breath.    Cardiovascular:  Negative for chest pain and palpitations.   Gastrointestinal:  Negative for abdominal pain, blood in stool, diarrhea, nausea and vomiting.   Genitourinary:  Positive for vaginal discharge. Negative for decreased urine volume and dysuria.   Musculoskeletal:  Negative for arthralgias.   Integumentary:  Negative for rash and wound.   Neurological:  Negative for dizziness, facial asymmetry and headaches.     Medical / Social / Family History     Past Medical History:   Diagnosis Date    Anemia, unspecified     Asthma     Hypertension     Uterine fibroid        Past Surgical History:   Procedure Laterality Date    left wrist fracture      right wrist skin graft         Social History  Ms.  reports that she has quit smoking. She has never been exposed to tobacco smoke. She has never used smokeless tobacco. She reports current alcohol use. She reports that she does not currently use drugs.    Family History  Ms.'s family history includes Diabetes in her mother; Hypertension in her maternal grandmother and mother.    Medications and Allergies     Medications  Current Outpatient Medications   Medication Sig Dispense Refill    albuterol (PROVENTIL/VENTOLIN  HFA) 90 mcg/actuation inhaler Inhale 2 puffs into the lungs every 6 (six) hours as needed for Wheezing. Rescue 18 g 2    amLODIPine (NORVASC) 5 MG tablet Take 1 tablet (5 mg total) by mouth once daily. 90 tablet 3    ferrous sulfate 324 mg (65 mg iron) TbEC Take 1 tablet (324 mg total) by mouth 2 (two) times daily. 30 tablet 2    fluconazole (DIFLUCAN) 150 MG Tab Take 1 tablet (150 mg total) by mouth once daily. for 2 days 2 tablet 0    metroNIDAZOLE (FLAGYL) 500 MG tablet Take 1 tablet (500 mg total) by mouth every 12 (twelve) hours. 14 tablet 0     No current facility-administered medications for this visit.       Allergies  Review of patient's allergies indicates:  No Known Allergies    Physical Examination     Vitals:    04/09/24 1002   BP: 119/83   Pulse:    Temp:      Physical Exam  Vitals and nursing note reviewed.   Constitutional:       Appearance: Normal appearance. She is obese.   HENT:      Head: Normocephalic.      Right Ear: External ear normal.      Left Ear: External ear normal.      Nose: Nose normal.      Mouth/Throat:      Mouth: Mucous membranes are moist.      Pharynx: Oropharynx is clear.   Eyes:      Extraocular Movements: Extraocular movements intact.      Conjunctiva/sclera: Conjunctivae normal.      Pupils: Pupils are equal, round, and reactive to light.   Cardiovascular:      Rate and Rhythm: Normal rate and regular rhythm.      Pulses: Normal pulses.      Heart sounds: Normal heart sounds.   Pulmonary:      Effort: Pulmonary effort is normal.      Breath sounds: Normal breath sounds.   Abdominal:      General: Abdomen is flat. Bowel sounds are normal.      Palpations: Abdomen is soft.   Musculoskeletal:         General: Normal range of motion.      Cervical back: Normal range of motion and neck supple.   Skin:     General: Skin is warm and dry.      Capillary Refill: Capillary refill takes less than 2 seconds.   Neurological:      General: No focal deficit present.      Mental Status:  She is alert and oriented to person, place, and time.   Psychiatric:         Mood and Affect: Mood normal.         Behavior: Behavior normal.         Thought Content: Thought content normal.         Judgment: Judgment normal.             Lab Results   Component Value Date    WBC 7.08 03/05/2024    HGB 7.6 (L) 03/05/2024    HCT 27.0 (L) 03/05/2024    MCV 73.0 (L) 03/05/2024     (H) 03/05/2024          Sodium   Date Value Ref Range Status   03/05/2024 140 136 - 145 mmol/L Final     Potassium   Date Value Ref Range Status   03/05/2024 3.5 3.5 - 5.1 mmol/L Final     Chloride   Date Value Ref Range Status   03/05/2024 110 (H) 98 - 107 mmol/L Final     CO2   Date Value Ref Range Status   03/05/2024 26 21 - 32 mmol/L Final     Glucose   Date Value Ref Range Status   03/05/2024 91 74 - 106 mg/dL Final     BUN   Date Value Ref Range Status   03/05/2024 7 7 - 18 mg/dL Final     Creatinine   Date Value Ref Range Status   03/05/2024 0.59 0.55 - 1.02 mg/dL Final     Calcium   Date Value Ref Range Status   03/05/2024 8.8 8.5 - 10.1 mg/dL Final     Total Protein   Date Value Ref Range Status   03/05/2024 7.3 6.4 - 8.2 g/dL Final     Albumin   Date Value Ref Range Status   03/05/2024 3.5 3.5 - 5.0 g/dL Final     Bilirubin, Total   Date Value Ref Range Status   03/05/2024 0.4 >0.0 - 1.2 mg/dL Final     Alk Phos   Date Value Ref Range Status   03/05/2024 90 39 - 100 U/L Final     AST   Date Value Ref Range Status   03/05/2024 12 (L) 15 - 37 U/L Final     ALT   Date Value Ref Range Status   03/05/2024 21 13 - 56 U/L Final     Anion Gap   Date Value Ref Range Status   03/05/2024 8 7 - 16 mmol/L Final     eGFR    Date Value Ref Range Status   12/16/2021 134 >=60 mL/min/1.73m² Final      Procedures   Assessment and Plan (including Health Maintenance)      Problem List  Smart Sets  Document Outside HM   :    Plan:   Problem List Items Addressed This Visit          Pulmonary    Mild intermittent asthma without  complication    Current Assessment & Plan     Rescue inhaler refilled  Use 2 puffs q 4-6 hours prn rescue  Report use more than 2-3 x per week              Cardiac/Vascular    Hypertension    Overview     BP running low         Current Assessment & Plan     Continue norvasc 5mg  DASH diet, 64 oz water per day, routine aerobic exercise  Follow up in 6 months or sooner if needed.             Oncology    History of anemia    Relevant Medications    ferrous sulfate 324 mg (65 mg iron) TbEC     Other Visit Diagnoses       Encounter for screening for infections with a predominantly sexual mode of transmission    -  Primary    Relevant Orders    Bacterial Vaginosis (Completed)    Asthma, unspecified asthma severity, unspecified whether complicated, unspecified whether persistent        Relevant Medications    albuterol (PROVENTIL/VENTOLIN HFA) 90 mcg/actuation inhaler            Health Maintenance Topics with due status: Not Due       Topic Last Completion Date    Hemoglobin A1c (Diabetic Prevention Screening) 12/16/2021    Cervical Cancer Screening 01/24/2023    Lipid Panel 03/08/2024           Future Appointments   Date Time Provider Department Center   5/7/2024  7:30 AM Atul Almanzar MD [facility]   5/23/2024  9:45 AM Atul Almanzar MD [facility]   6/3/2024  2:00 PM [facility]   6/10/2024  8:20 AM Francisca Plata FNP-C [facility]   7/29/2024 10:00 AM [facility] GI ROOM 01 [facility]        Health Maintenance Due   Topic Date Due    Pneumococcal Vaccines (Age 0-64) (1 of 2 - PCV) Never done    TETANUS VACCINE  Never done    Colorectal Cancer Screening  Never done    Mammogram  03/16/2022    Influenza Vaccine (1) 09/01/2023    COVID-19 Vaccine (3 - 2023-24 season) 09/01/2023        Follow up in about 6 months (around 10/9/2024).     Signature: GONSALO Ramos        Date of encounter: 4/9/24

## 2024-04-19 NOTE — ASSESSMENT & PLAN NOTE
Rescue inhaler refilled  Use 2 puffs q 4-6 hours prn rescue  Report use more than 2-3 x per week

## 2024-05-06 DIAGNOSIS — Z01.818 PRE-OP EXAMINATION: Primary | ICD-10-CM

## 2024-05-07 ENCOUNTER — OFFICE VISIT (OUTPATIENT)
Dept: OBSTETRICS AND GYNECOLOGY | Facility: CLINIC | Age: 49
End: 2024-05-07
Payer: MEDICAID

## 2024-05-07 VITALS
WEIGHT: 190 LBS | BODY MASS INDEX: 31.65 KG/M2 | HEART RATE: 67 BPM | SYSTOLIC BLOOD PRESSURE: 144 MMHG | HEIGHT: 65 IN | DIASTOLIC BLOOD PRESSURE: 97 MMHG | RESPIRATION RATE: 18 BRPM | TEMPERATURE: 98 F

## 2024-05-07 DIAGNOSIS — D25.1 INTRAMURAL LEIOMYOMA OF UTERUS: Primary | ICD-10-CM

## 2024-05-07 PROCEDURE — 3077F SYST BP >= 140 MM HG: CPT | Mod: CPTII,,, | Performed by: OBSTETRICS & GYNECOLOGY

## 2024-05-07 PROCEDURE — 3080F DIAST BP >= 90 MM HG: CPT | Mod: CPTII,,, | Performed by: OBSTETRICS & GYNECOLOGY

## 2024-05-07 PROCEDURE — 99214 OFFICE O/P EST MOD 30 MIN: CPT | Mod: PBBFAC | Performed by: OBSTETRICS & GYNECOLOGY

## 2024-05-07 PROCEDURE — 99024 POSTOP FOLLOW-UP VISIT: CPT | Mod: ,,, | Performed by: OBSTETRICS & GYNECOLOGY

## 2024-05-07 RX ORDER — POLYETHYLENE GLYCOL 3350, SODIUM SULFATE ANHYDROUS, SODIUM BICARBONATE, SODIUM CHLORIDE, POTASSIUM CHLORIDE 236; 22.74; 6.74; 5.86; 2.97 G/4L; G/4L; G/4L; G/4L; G/4L
POWDER, FOR SOLUTION ORAL
COMMUNITY
Start: 2024-03-05

## 2024-05-07 NOTE — PROGRESS NOTES
[facility] - Obstetrics And Gynecology  Obstetrics & Gynecology  History & Physical    Patient Name: Madelaine Palacio  MRN: [id number]  Admission Date: (Not on file)  Primary Care Provider: Francisca Plata FNP-C    Subjective:     Chief Complaint/Reason for Admission:  Presents for robotically assisted hysterectomy she desires leaving ovaries intact unless clinically indicated for removal at the time of surgery.        History of Present Illness:  Patient has a history of large uterine leiomyoma.      She also has significant menorrhagia with resulting anemia.      Atul Almanzar MD at 11/9/2023  1:45 PM    Status: Signed   Expand All Collapse All     Subjective:         Subjective  Patient ID: Madelaine Palacio is a 48 y.o. female.     Chief Complaint: Dysfunctional Uterine Bleeding (Pt presents c/o irregular menses. )     Patient presents complaining of some persistent bleeding     She elected not to take oral contraceptives as directed.       No weakness or dizziness.     After long detailed discussion in the office we have decided to check CBC and hCG.  If hCG is is negative and no significant anemia we will simply give Depo-Provera.       She understands irregular bleeding may continue even with Depo-Provera injection.     We discussed alternative management of intrauterine artery embolization versus ablation.  However my recommendation is to proceed with hysterectomy.       Follow-up lab revealed hCG negative.  Hematocrit 31.4% which is 1 point greater than 2 months ago.     She was encouraged to continue multivitamins with iron supplements.        Preoperative examination  Patient was originally scheduled for surgery earlier.  However she desired to postpone her surgery.  She now re presents for scheduled surgery on Monday.      We have discussed proceeding with robotically assisted hysterectomy she desires ovaries being left intact unless clinically indicated for removal at the time of surgery.       She understands if ovaries are removed hormone replacement will be necessary.     Patient is nulligravida.  She has never been pregnant.  Current Outpatient Medications on File Prior to Visit   Medication Sig    albuterol (PROVENTIL/VENTOLIN HFA) 90 mcg/actuation inhaler Inhale 2 puffs into the lungs every 6 (six) hours as needed for Wheezing. Rescue    amLODIPine (NORVASC) 5 MG tablet Take 1 tablet (5 mg total) by mouth once daily.    ferrous sulfate 324 mg (65 mg iron) TbEC Take 1 tablet (324 mg total) by mouth 2 (two) times daily.    metroNIDAZOLE (FLAGYL) 500 MG tablet Take 1 tablet (500 mg total) by mouth every 12 (twelve) hours.    polyethylene glycol (GOLYTELY) 236-22.74-6.74 -5.86 gram suspension SMARTSIG:Milliliter(s) By Mouth     No current facility-administered medications on file prior to visit.       Review of patient's allergies indicates:  No Known Allergies    Past Medical History:   Diagnosis Date    Anemia, unspecified     Asthma     Hypertension     Uterine fibroid      OB History    Para Term  AB Living   0 0 0 0 0 0   SAB IAB Ectopic Multiple Live Births   0 0 0 0 0     Past Surgical History:   Procedure Laterality Date    left wrist fracture      right wrist skin graft       Family History       Problem Relation (Age of Onset)    Diabetes Mother    Hypertension Mother, Maternal Grandmother          Tobacco Use    Smoking status: Former     Types: Cigarettes     Passive exposure: Never    Smokeless tobacco: Never   Substance and Sexual Activity    Alcohol use: Yes     Comment: monthly     Drug use: Not Currently    Sexual activity: Yes     Partners: Male     Birth control/protection: Condom     Review of Systems   Respiratory: Negative.     Cardiovascular: Negative.    Neurological: Negative.    Hematological: Negative.      Objective:     Vital Signs (Most Recent):  Temp: 97.6 °F (36.4 °C) (24 1028)  Pulse: 67 (24 1028)  Resp: 18 (24 1028)  BP: (!) 144/97  (05/07/24 1028) Vital Signs (24h Range):  [unfilled]     Weight: 86.2 kg (190 lb)  Body mass index is 31.62 kg/m².  Patient's last menstrual period was 04/26/2024 (approximate).    Physical Exam:   Constitutional: She is oriented to person, place, and time. She appears well-developed and well-nourished.    HENT:   Head: Normocephalic and atraumatic.    Eyes: Pupils are equal, round, and reactive to light. EOM are normal.     Cardiovascular:  Normal rate, regular rhythm and normal heart sounds.             Pulmonary/Chest: Breath sounds normal.        Abdominal: Soft. Bowel sounds are normal.     Genitourinary:    Genitourinary Comments: Bimanual exam revealed uterus irregular shape size of proximally 16 week gestational size.      Pap done 2023 class 1  eneral Categorization  Negative for intraepithelial lesion or malignancy                     Musculoskeletal: Normal range of motion.       Neurological: She is alert and oriented to person, place, and time.    Skin: Skin is warm.    Psychiatric: She has a normal mood and affect. Her behavior is normal. Judgment and thought content normal.       Laboratory:    Lab Visit on 05/07/2024   Component Date Value Ref Range Status    Sodium 05/07/2024 144  136 - 145 mmol/L Final    Potassium 05/07/2024 4.0  3.5 - 5.1 mmol/L Final    Chloride 05/07/2024 108 (H)  98 - 107 mmol/L Final    CO2 05/07/2024 28  21 - 32 mmol/L Final    Anion Gap 05/07/2024 12  7 - 16 mmol/L Final    Glucose 05/07/2024 92  74 - 106 mg/dL Final    BUN 05/07/2024 10  7 - 18 mg/dL Final    Creatinine 05/07/2024 0.72  0.55 - 1.02 mg/dL Final    BUN/Creatinine Ratio 05/07/2024 14  6 - 20 Final    Calcium 05/07/2024 9.0  8.5 - 10.1 mg/dL Final    Total Protein 05/07/2024 7.4  6.4 - 8.2 g/dL Final    Albumin 05/07/2024 3.3 (L)  3.5 - 5.0 g/dL Final    Globulin 05/07/2024 4.1 (H)  2.0 - 4.0 g/dL Final    A/G Ratio 05/07/2024 0.8   Final    Bilirubin, Total 05/07/2024 0.2  >0.0 - 1.2 mg/dL Final    Alk Phos  05/07/2024 98  39 - 100 U/L Final    ALT 05/07/2024 29  13 - 56 U/L Final    AST 05/07/2024 16  15 - 37 U/L Final    eGFR 05/07/2024 103  >=60 mL/min/1.73m2 Final    Pregnancy, Serum 05/07/2024 Negative  Negative, QNS Final    WBC 05/07/2024 5.95  4.50 - 11.00 K/uL Final    RBC 05/07/2024 4.26  4.20 - 5.40 M/uL Final    Hemoglobin 05/07/2024 7.8 (L)  12.0 - 16.0 g/dL Final    Hematocrit 05/07/2024 28.4 (L)  38.0 - 47.0 % Final    MCV 05/07/2024 66.7 (L)  80.0 - 96.0 fL Final    MCH 05/07/2024 18.3 (L)  27.0 - 31.0 pg Final    MCHC 05/07/2024 27.5 (L)  32.0 - 36.0 g/dL Final    RDW 05/07/2024 17.6 (H)  11.5 - 14.5 % Final    Platelet Count 05/07/2024 509 (H)  150 - 400 K/uL Final    MPV 05/07/2024 9.2 (L)  9.4 - 12.4 fL Final    Neutrophils % 05/07/2024 50.2 (L)  53.0 - 65.0 % Final    Lymphocytes % 05/07/2024 33.9  27.0 - 41.0 % Final    Monocytes % 05/07/2024 13.3 (H)  2.0 - 6.0 % Final    Eosinophils % 05/07/2024 2.0  1.0 - 4.0 % Final    Basophils % 05/07/2024 0.3  0.0 - 1.0 % Final    Immature Granulocytes % 05/07/2024 0.3  0.0 - 0.4 % Final    nRBC, Auto 05/07/2024 0.0  <=0.0 % Final    Neutrophils, Abs 05/07/2024 2.98  1.80 - 7.70 K/uL Final    Lymphocytes, Absolute 05/07/2024 2.02  1.00 - 4.80 K/uL Final    Monocytes, Absolute 05/07/2024 0.79  0.00 - 0.80 K/uL Final    Eosinophils, Absolute 05/07/2024 0.12  0.00 - 0.50 K/uL Final    Basophils, Absolute 05/07/2024 0.02  0.00 - 0.20 K/uL Final    Immature Granulocytes, Absolute 05/07/2024 0.02  0.00 - 0.04 K/uL Final    nRBC, Absolute 05/07/2024 0.00  <=0.00 x10e3/uL Final    Diff Type 05/07/2024 Scan Smear   Final    Platelet Morphology 05/07/2024 Increased (A)  Normal Corrected    Anisocytosis 05/07/2024 1+   Final    Microcytosis 05/07/2024 1+   Final    Ovalocytes 05/07/2024 Few   Final    Polychromasia 05/07/2024 Few   Final    Hypochromic 05/07/2024 Few   Final        Diagnostic Results:    Reading Physician Reading Date Result Priority   Baldomero Michelle  ARACELI MENDOZA MD  [phone] 3/5/2024 Routine     Narrative & Impression  EXAMINATION:  XR CHEST PA AND LATERAL     CLINICAL HISTORY:  Encounter for other preprocedural examination     COMPARISON:  None available     TECHNIQUE:  XR CHEST PA AND LATERAL     FINDINGS:  The heart and mediastinum are normal in size and configuration.  The pulmonary vascularity is normal in caliber.  No lung infiltrates, effusions, pneumothorax or other abnormality is demonstrated.     Impression:     No evidence of cardiopulmonary disease.        Electronically signed by:Baldomero Michelle  Date:                                            03/05/2024  Time:                                           08:13            Narrative  Performed by: TAE  Test Reason : Z01.818,    Vent. Rate : 070 BPM     Atrial Rate : 070 BPM     P-R Int : 162 ms          QRS Dur : 084 ms      QT Int : 412 ms       P-R-T Axes : 065 085 007 degrees     QTc Int : 444 ms    Normal sinus rhythm  Normal ECG  No previous ECGs available  Confirmed by Jose Mohamud DO ([id number]) on 3/11/2024 2:47:53 PM    Referred By: DANIEL ARELLANO           Confirmed By:Jose Mohamud DO      Specimen Collected: 03/05/24 08:39 CST             US Pelvis Complete Non OB    Narrative  EXAMINATION:  US PELVIS COMPLETE NON OB    CLINICAL HISTORY:  Excessive and frequent menstruation with irregular cycle    TECHNIQUE:  Grayscale and color Doppler imaging performed    COMPARISON:  None.    FINDINGS:  The uterine length is 13.28 cm.  Endometrium is obscured by multiple large leiomyoma, largest 3.9 x 4.1 x 4.1 cm.    The left ovary length is 4.2 cm.    No focal abnormality seen. No free fluid is identified    Impression  Uterine leiomyoma.  No other abnormality demonstrated.      Electronically signed by: Baldomero Michelle  Date:    07/11/2023  Time:    15:59    No results found for this or any previous visit.    Results for orders placed during the hospital encounter of 03/05/24    X-Ray Chest PA And  Lateral    Narrative  EXAMINATION:  XR CHEST PA AND LATERAL    CLINICAL HISTORY:  Encounter for other preprocedural examination    COMPARISON:  None available    TECHNIQUE:  XR CHEST PA AND LATERAL    FINDINGS:  The heart and mediastinum are normal in size and configuration.  The pulmonary vascularity is normal in caliber.  No lung infiltrates, effusions, pneumothorax or other abnormality is demonstrated.    Impression  No evidence of cardiopulmonary disease.      Electronically signed by: Baldomero Michelle  Date:    03/05/2024  Time:    08:13    .        Assessment/Plan:       We discussed proceeding with robotically assisted hysterectomy.      She understands the possibility of conversion to open laparotomy.      We will remove ovaries only if clinically indicated for removal.      The risk of surgery including anesthetic risk bleeding infection blood clots bowel or bladder or ureter injury discussed anemia with hematocrit of 28%.    The possibility of blood transfusion has been discussed if necessary.            Atul Almanzar MD  Obstetrics & Gynecology  [facility] - Obstetrics [facility] Gyne[facility]

## 2024-05-07 NOTE — PATIENT INSTRUCTIONS
We discussed proceeding with robotically assisted hysterectomy.      She understands the possibility of conversion to open laparotomy.      We will remove ovaries only if clinically indicated for removal.      The risk of surgery including anesthetic risk bleeding infection blood clots bowel or bladder or ureter injury discussed anemia with hematocrit of 28%.    The possibility of blood transfusion has been discussed if necessary.

## 2024-05-07 NOTE — H&P (VIEW-ONLY)
[facility] - Obstetrics And Gynecology  Obstetrics & Gynecology  History & Physical    Patient Name: Madelaine Palacio  MRN: [id number]  Admission Date: (Not on file)  Primary Care Provider: Francisca Plata FNP-C    Subjective:     Chief Complaint/Reason for Admission:  Presents for robotically assisted hysterectomy she desires leaving ovaries intact unless clinically indicated for removal at the time of surgery.        History of Present Illness:  Patient has a history of large uterine leiomyoma.      She also has significant menorrhagia with resulting anemia.      Atul Almanzar MD at 11/9/2023  1:45 PM    Status: Signed   Expand All Collapse All     Subjective:         Subjective  Patient ID: Madelaine Palacio is a 48 y.o. female.     Chief Complaint: Dysfunctional Uterine Bleeding (Pt presents c/o irregular menses. )     Patient presents complaining of some persistent bleeding     She elected not to take oral contraceptives as directed.       No weakness or dizziness.     After long detailed discussion in the office we have decided to check CBC and hCG.  If hCG is is negative and no significant anemia we will simply give Depo-Provera.       She understands irregular bleeding may continue even with Depo-Provera injection.     We discussed alternative management of intrauterine artery embolization versus ablation.  However my recommendation is to proceed with hysterectomy.       Follow-up lab revealed hCG negative.  Hematocrit 31.4% which is 1 point greater than 2 months ago.     She was encouraged to continue multivitamins with iron supplements.        Preoperative examination  Patient was originally scheduled for surgery earlier.  However she desired to postpone her surgery.  She now re presents for scheduled surgery on Monday.      We have discussed proceeding with robotically assisted hysterectomy she desires ovaries being left intact unless clinically indicated for removal at the time of surgery.       She understands if ovaries are removed hormone replacement will be necessary.     Patient is nulligravida.  She has never been pregnant.  Current Outpatient Medications on File Prior to Visit   Medication Sig    albuterol (PROVENTIL/VENTOLIN HFA) 90 mcg/actuation inhaler Inhale 2 puffs into the lungs every 6 (six) hours as needed for Wheezing. Rescue    amLODIPine (NORVASC) 5 MG tablet Take 1 tablet (5 mg total) by mouth once daily.    ferrous sulfate 324 mg (65 mg iron) TbEC Take 1 tablet (324 mg total) by mouth 2 (two) times daily.    metroNIDAZOLE (FLAGYL) 500 MG tablet Take 1 tablet (500 mg total) by mouth every 12 (twelve) hours.    polyethylene glycol (GOLYTELY) 236-22.74-6.74 -5.86 gram suspension SMARTSIG:Milliliter(s) By Mouth     No current facility-administered medications on file prior to visit.       Review of patient's allergies indicates:  No Known Allergies    Past Medical History:   Diagnosis Date    Anemia, unspecified     Asthma     Hypertension     Uterine fibroid      OB History    Para Term  AB Living   0 0 0 0 0 0   SAB IAB Ectopic Multiple Live Births   0 0 0 0 0     Past Surgical History:   Procedure Laterality Date    left wrist fracture      right wrist skin graft       Family History       Problem Relation (Age of Onset)    Diabetes Mother    Hypertension Mother, Maternal Grandmother          Tobacco Use    Smoking status: Former     Types: Cigarettes     Passive exposure: Never    Smokeless tobacco: Never   Substance and Sexual Activity    Alcohol use: Yes     Comment: monthly     Drug use: Not Currently    Sexual activity: Yes     Partners: Male     Birth control/protection: Condom     Review of Systems   Respiratory: Negative.     Cardiovascular: Negative.    Neurological: Negative.    Hematological: Negative.      Objective:     Vital Signs (Most Recent):  Temp: 97.6 °F (36.4 °C) (24 1028)  Pulse: 67 (24 1028)  Resp: 18 (24 1028)  BP: (!) 144/97  (05/07/24 1028) Vital Signs (24h Range):  [unfilled]     Weight: 86.2 kg (190 lb)  Body mass index is 31.62 kg/m².  Patient's last menstrual period was 04/26/2024 (approximate).    Physical Exam:   Constitutional: She is oriented to person, place, and time. She appears well-developed and well-nourished.    HENT:   Head: Normocephalic and atraumatic.    Eyes: Pupils are equal, round, and reactive to light. EOM are normal.     Cardiovascular:  Normal rate, regular rhythm and normal heart sounds.             Pulmonary/Chest: Breath sounds normal.        Abdominal: Soft. Bowel sounds are normal.     Genitourinary:    Genitourinary Comments: Bimanual exam revealed uterus irregular shape size of proximally 16 week gestational size.      Pap done 2023 class 1  eneral Categorization  Negative for intraepithelial lesion or malignancy                     Musculoskeletal: Normal range of motion.       Neurological: She is alert and oriented to person, place, and time.    Skin: Skin is warm.    Psychiatric: She has a normal mood and affect. Her behavior is normal. Judgment and thought content normal.       Laboratory:    Lab Visit on 05/07/2024   Component Date Value Ref Range Status    Sodium 05/07/2024 144  136 - 145 mmol/L Final    Potassium 05/07/2024 4.0  3.5 - 5.1 mmol/L Final    Chloride 05/07/2024 108 (H)  98 - 107 mmol/L Final    CO2 05/07/2024 28  21 - 32 mmol/L Final    Anion Gap 05/07/2024 12  7 - 16 mmol/L Final    Glucose 05/07/2024 92  74 - 106 mg/dL Final    BUN 05/07/2024 10  7 - 18 mg/dL Final    Creatinine 05/07/2024 0.72  0.55 - 1.02 mg/dL Final    BUN/Creatinine Ratio 05/07/2024 14  6 - 20 Final    Calcium 05/07/2024 9.0  8.5 - 10.1 mg/dL Final    Total Protein 05/07/2024 7.4  6.4 - 8.2 g/dL Final    Albumin 05/07/2024 3.3 (L)  3.5 - 5.0 g/dL Final    Globulin 05/07/2024 4.1 (H)  2.0 - 4.0 g/dL Final    A/G Ratio 05/07/2024 0.8   Final    Bilirubin, Total 05/07/2024 0.2  >0.0 - 1.2 mg/dL Final    Alk Phos  05/07/2024 98  39 - 100 U/L Final    ALT 05/07/2024 29  13 - 56 U/L Final    AST 05/07/2024 16  15 - 37 U/L Final    eGFR 05/07/2024 103  >=60 mL/min/1.73m2 Final    Pregnancy, Serum 05/07/2024 Negative  Negative, QNS Final    WBC 05/07/2024 5.95  4.50 - 11.00 K/uL Final    RBC 05/07/2024 4.26  4.20 - 5.40 M/uL Final    Hemoglobin 05/07/2024 7.8 (L)  12.0 - 16.0 g/dL Final    Hematocrit 05/07/2024 28.4 (L)  38.0 - 47.0 % Final    MCV 05/07/2024 66.7 (L)  80.0 - 96.0 fL Final    MCH 05/07/2024 18.3 (L)  27.0 - 31.0 pg Final    MCHC 05/07/2024 27.5 (L)  32.0 - 36.0 g/dL Final    RDW 05/07/2024 17.6 (H)  11.5 - 14.5 % Final    Platelet Count 05/07/2024 509 (H)  150 - 400 K/uL Final    MPV 05/07/2024 9.2 (L)  9.4 - 12.4 fL Final    Neutrophils % 05/07/2024 50.2 (L)  53.0 - 65.0 % Final    Lymphocytes % 05/07/2024 33.9  27.0 - 41.0 % Final    Monocytes % 05/07/2024 13.3 (H)  2.0 - 6.0 % Final    Eosinophils % 05/07/2024 2.0  1.0 - 4.0 % Final    Basophils % 05/07/2024 0.3  0.0 - 1.0 % Final    Immature Granulocytes % 05/07/2024 0.3  0.0 - 0.4 % Final    nRBC, Auto 05/07/2024 0.0  <=0.0 % Final    Neutrophils, Abs 05/07/2024 2.98  1.80 - 7.70 K/uL Final    Lymphocytes, Absolute 05/07/2024 2.02  1.00 - 4.80 K/uL Final    Monocytes, Absolute 05/07/2024 0.79  0.00 - 0.80 K/uL Final    Eosinophils, Absolute 05/07/2024 0.12  0.00 - 0.50 K/uL Final    Basophils, Absolute 05/07/2024 0.02  0.00 - 0.20 K/uL Final    Immature Granulocytes, Absolute 05/07/2024 0.02  0.00 - 0.04 K/uL Final    nRBC, Absolute 05/07/2024 0.00  <=0.00 x10e3/uL Final    Diff Type 05/07/2024 Scan Smear   Final    Platelet Morphology 05/07/2024 Increased (A)  Normal Corrected    Anisocytosis 05/07/2024 1+   Final    Microcytosis 05/07/2024 1+   Final    Ovalocytes 05/07/2024 Few   Final    Polychromasia 05/07/2024 Few   Final    Hypochromic 05/07/2024 Few   Final        Diagnostic Results:    Reading Physician Reading Date Result Priority   Baldomero Michelle  ARACELI MENDOZA MD  [phone] 3/5/2024 Routine     Narrative & Impression  EXAMINATION:  XR CHEST PA AND LATERAL     CLINICAL HISTORY:  Encounter for other preprocedural examination     COMPARISON:  None available     TECHNIQUE:  XR CHEST PA AND LATERAL     FINDINGS:  The heart and mediastinum are normal in size and configuration.  The pulmonary vascularity is normal in caliber.  No lung infiltrates, effusions, pneumothorax or other abnormality is demonstrated.     Impression:     No evidence of cardiopulmonary disease.        Electronically signed by:Baldomero Michelle  Date:                                            03/05/2024  Time:                                           08:13            Narrative  Performed by: TAE  Test Reason : Z01.818,    Vent. Rate : 070 BPM     Atrial Rate : 070 BPM     P-R Int : 162 ms          QRS Dur : 084 ms      QT Int : 412 ms       P-R-T Axes : 065 085 007 degrees     QTc Int : 444 ms    Normal sinus rhythm  Normal ECG  No previous ECGs available  Confirmed by Jose Mohamud DO ([id number]) on 3/11/2024 2:47:53 PM    Referred By: DANIEL ARELLANO           Confirmed By:Jose Mohamud DO      Specimen Collected: 03/05/24 08:39 CST             US Pelvis Complete Non OB    Narrative  EXAMINATION:  US PELVIS COMPLETE NON OB    CLINICAL HISTORY:  Excessive and frequent menstruation with irregular cycle    TECHNIQUE:  Grayscale and color Doppler imaging performed    COMPARISON:  None.    FINDINGS:  The uterine length is 13.28 cm.  Endometrium is obscured by multiple large leiomyoma, largest 3.9 x 4.1 x 4.1 cm.    The left ovary length is 4.2 cm.    No focal abnormality seen. No free fluid is identified    Impression  Uterine leiomyoma.  No other abnormality demonstrated.      Electronically signed by: Baldomero Michelle  Date:    07/11/2023  Time:    15:59    No results found for this or any previous visit.    Results for orders placed during the hospital encounter of 03/05/24    X-Ray Chest PA And  Lateral    Narrative  EXAMINATION:  XR CHEST PA AND LATERAL    CLINICAL HISTORY:  Encounter for other preprocedural examination    COMPARISON:  None available    TECHNIQUE:  XR CHEST PA AND LATERAL    FINDINGS:  The heart and mediastinum are normal in size and configuration.  The pulmonary vascularity is normal in caliber.  No lung infiltrates, effusions, pneumothorax or other abnormality is demonstrated.    Impression  No evidence of cardiopulmonary disease.      Electronically signed by: Baldomero Michelle  Date:    03/05/2024  Time:    08:13    .        Assessment/Plan:       We discussed proceeding with robotically assisted hysterectomy.      She understands the possibility of conversion to open laparotomy.      We will remove ovaries only if clinically indicated for removal.      The risk of surgery including anesthetic risk bleeding infection blood clots bowel or bladder or ureter injury discussed anemia with hematocrit of 28%.    The possibility of blood transfusion has been discussed if necessary.            Atul Almanzar MD  Obstetrics & Gynecology  [facility] - Obstetrics And Gynecology

## 2024-05-13 ENCOUNTER — ANESTHESIA EVENT (OUTPATIENT)
Dept: SURGERY | Facility: HOSPITAL | Age: 49
End: 2024-05-13
Payer: MEDICAID

## 2024-05-13 ENCOUNTER — HOSPITAL ENCOUNTER (OUTPATIENT)
Facility: HOSPITAL | Age: 49
Discharge: HOME | End: 2024-05-14
Attending: OBSTETRICS & GYNECOLOGY | Admitting: OBSTETRICS & GYNECOLOGY
Payer: MEDICAID

## 2024-05-13 ENCOUNTER — ANESTHESIA (OUTPATIENT)
Dept: SURGERY | Facility: HOSPITAL | Age: 49
End: 2024-05-13
Payer: MEDICAID

## 2024-05-13 DIAGNOSIS — N92.4 EXCESSIVE BLEEDING IN PREMENOPAUSAL PERIOD: ICD-10-CM

## 2024-05-13 DIAGNOSIS — D25.1 INTRAMURAL LEIOMYOMA OF UTERUS: Primary | ICD-10-CM

## 2024-05-13 DIAGNOSIS — D25.9 UTERINE LEIOMYOMA, UNSPECIFIED LOCATION: ICD-10-CM

## 2024-05-13 DIAGNOSIS — Z86.2 HISTORY OF ANEMIA: ICD-10-CM

## 2024-05-13 DIAGNOSIS — D25.9 UTERINE LEIOMYOMA: ICD-10-CM

## 2024-05-13 LAB
ABORH RETYPE: NORMAL
B-HCG UR QL: NEGATIVE
CTP QC/QA: YES
HCT VFR BLD AUTO: 30 % (ref 38–47)
HGB BLD-MCNC: 8.2 G/DL (ref 12–16)
INDIRECT COOMBS: NORMAL
RH BLD: NORMAL
SPECIMEN OUTDATE: NORMAL

## 2024-05-13 PROCEDURE — 36000713 HC OR TIME LEV V EA ADD 15 MIN: Performed by: OBSTETRICS & GYNECOLOGY

## 2024-05-13 PROCEDURE — 99900035 HC TECH TIME PER 15 MIN (STAT)

## 2024-05-13 PROCEDURE — 25000242 PHARM REV CODE 250 ALT 637 W/ HCPCS: Performed by: ANESTHESIOLOGY

## 2024-05-13 PROCEDURE — 85014 HEMATOCRIT: CPT | Performed by: OBSTETRICS & GYNECOLOGY

## 2024-05-13 PROCEDURE — 88307 TISSUE EXAM BY PATHOLOGIST: CPT | Mod: 26,,, | Performed by: PATHOLOGY

## 2024-05-13 PROCEDURE — 94640 AIRWAY INHALATION TREATMENT: CPT

## 2024-05-13 PROCEDURE — 27000509 HC TUBE SALEM SUMP ANY SIZE: Performed by: ANESTHESIOLOGY

## 2024-05-13 PROCEDURE — 36415 COLL VENOUS BLD VENIPUNCTURE: CPT | Performed by: OBSTETRICS & GYNECOLOGY

## 2024-05-13 PROCEDURE — 27000689 HC BLADE LARYNGOSCOPE ANY SIZE: Performed by: ANESTHESIOLOGY

## 2024-05-13 PROCEDURE — D9220A PRA ANESTHESIA: Mod: ANES,,, | Performed by: ANESTHESIOLOGY

## 2024-05-13 PROCEDURE — 25000003 PHARM REV CODE 250: Performed by: NURSE ANESTHETIST, CERTIFIED REGISTERED

## 2024-05-13 PROCEDURE — 36000712 HC OR TIME LEV V 1ST 15 MIN: Performed by: OBSTETRICS & GYNECOLOGY

## 2024-05-13 PROCEDURE — 63600175 PHARM REV CODE 636 W HCPCS: Performed by: ANESTHESIOLOGY

## 2024-05-13 PROCEDURE — 27202344 HC EYESHIELD: Performed by: ANESTHESIOLOGY

## 2024-05-13 PROCEDURE — 37000008 HC ANESTHESIA 1ST 15 MINUTES: Performed by: OBSTETRICS & GYNECOLOGY

## 2024-05-13 PROCEDURE — 71000033 HC RECOVERY, INTIAL HOUR: Performed by: OBSTETRICS & GYNECOLOGY

## 2024-05-13 PROCEDURE — 27201423 OPTIME MED/SURG SUP & DEVICES STERILE SUPPLY: Performed by: OBSTETRICS & GYNECOLOGY

## 2024-05-13 PROCEDURE — 25000003 PHARM REV CODE 250: Performed by: OBSTETRICS & GYNECOLOGY

## 2024-05-13 PROCEDURE — 25000003 PHARM REV CODE 250: Performed by: ANESTHESIOLOGY

## 2024-05-13 PROCEDURE — 27000758 HC SPIROMETER

## 2024-05-13 PROCEDURE — C2628 CATHETER, OCCLUSION: HCPCS | Performed by: OBSTETRICS & GYNECOLOGY

## 2024-05-13 PROCEDURE — D9220A PRA ANESTHESIA: Mod: CRNA,,, | Performed by: NURSE ANESTHETIST, CERTIFIED REGISTERED

## 2024-05-13 PROCEDURE — 58573 TLH W/T/O UTERUS OVER 250 G: CPT | Mod: ,,, | Performed by: OBSTETRICS & GYNECOLOGY

## 2024-05-13 PROCEDURE — 63600175 PHARM REV CODE 636 W HCPCS: Performed by: NURSE ANESTHETIST, CERTIFIED REGISTERED

## 2024-05-13 PROCEDURE — 88307 TISSUE EXAM BY PATHOLOGIST: CPT | Mod: TC,SUR | Performed by: OBSTETRICS & GYNECOLOGY

## 2024-05-13 PROCEDURE — 37000009 HC ANESTHESIA EA ADD 15 MINS: Performed by: OBSTETRICS & GYNECOLOGY

## 2024-05-13 PROCEDURE — 63600175 PHARM REV CODE 636 W HCPCS: Performed by: OBSTETRICS & GYNECOLOGY

## 2024-05-13 PROCEDURE — 27000716 HC OXISENSOR PROBE, ANY SIZE: Performed by: ANESTHESIOLOGY

## 2024-05-13 PROCEDURE — 27200700 HC TUBE, ENDOTRACHEAL NASAL RAE: Performed by: ANESTHESIOLOGY

## 2024-05-13 PROCEDURE — 27000221 HC OXYGEN, UP TO 24 HOURS

## 2024-05-13 PROCEDURE — 81025 URINE PREGNANCY TEST: CPT | Performed by: OBSTETRICS & GYNECOLOGY

## 2024-05-13 PROCEDURE — 27000284 HC CANNULA NASAL: Performed by: ANESTHESIOLOGY

## 2024-05-13 PROCEDURE — 99900031 HC PATIENT EDUCATION (STAT)

## 2024-05-13 PROCEDURE — 27000510 HC BLANKET BAIR HUGGER ANY SIZE: Performed by: ANESTHESIOLOGY

## 2024-05-13 PROCEDURE — 27000165 HC TUBE, ETT CUFFED: Performed by: ANESTHESIOLOGY

## 2024-05-13 PROCEDURE — 27000655: Performed by: ANESTHESIOLOGY

## 2024-05-13 PROCEDURE — 94761 N-INVAS EAR/PLS OXIMETRY MLT: CPT

## 2024-05-13 PROCEDURE — 86901 BLOOD TYPING SEROLOGIC RH(D): CPT | Performed by: OBSTETRICS & GYNECOLOGY

## 2024-05-13 RX ORDER — ROCURONIUM BROMIDE 10 MG/ML
INJECTION, SOLUTION INTRAVENOUS
Status: DISCONTINUED | OUTPATIENT
Start: 2024-05-13 | End: 2024-05-13

## 2024-05-13 RX ORDER — MIDAZOLAM HYDROCHLORIDE 1 MG/ML
INJECTION INTRAMUSCULAR; INTRAVENOUS
Status: DISCONTINUED | OUTPATIENT
Start: 2024-05-13 | End: 2024-05-13

## 2024-05-13 RX ORDER — FAMOTIDINE 20 MG/1
20 TABLET, FILM COATED ORAL
Status: DISCONTINUED | OUTPATIENT
Start: 2024-05-13 | End: 2024-05-14 | Stop reason: HOSPADM

## 2024-05-13 RX ORDER — ONDANSETRON HYDROCHLORIDE 2 MG/ML
4 INJECTION, SOLUTION INTRAVENOUS DAILY PRN
Status: DISCONTINUED | OUTPATIENT
Start: 2024-05-13 | End: 2024-05-13 | Stop reason: HOSPADM

## 2024-05-13 RX ORDER — MORPHINE SULFATE 10 MG/ML
4 INJECTION INTRAMUSCULAR; INTRAVENOUS; SUBCUTANEOUS EVERY 5 MIN PRN
Status: DISCONTINUED | OUTPATIENT
Start: 2024-05-13 | End: 2024-05-13 | Stop reason: HOSPADM

## 2024-05-13 RX ORDER — LIDOCAINE HYDROCHLORIDE 20 MG/ML
INJECTION, SOLUTION EPIDURAL; INFILTRATION; INTRACAUDAL; PERINEURAL
Status: DISCONTINUED | OUTPATIENT
Start: 2024-05-13 | End: 2024-05-13

## 2024-05-13 RX ORDER — IPRATROPIUM BROMIDE AND ALBUTEROL SULFATE 2.5; .5 MG/3ML; MG/3ML
3 SOLUTION RESPIRATORY (INHALATION) ONCE
Status: COMPLETED | OUTPATIENT
Start: 2024-05-13 | End: 2024-05-13

## 2024-05-13 RX ORDER — MORPHINE SULFATE 4 MG/ML
4 INJECTION, SOLUTION INTRAMUSCULAR; INTRAVENOUS
Status: DISCONTINUED | OUTPATIENT
Start: 2024-05-13 | End: 2024-05-14 | Stop reason: HOSPADM

## 2024-05-13 RX ORDER — FUROSEMIDE 10 MG/ML
INJECTION INTRAMUSCULAR; INTRAVENOUS
Status: DISCONTINUED | OUTPATIENT
Start: 2024-05-13 | End: 2024-05-13

## 2024-05-13 RX ORDER — FAMOTIDINE 20 MG/1
20 TABLET, FILM COATED ORAL 2 TIMES DAILY
Status: DISCONTINUED | OUTPATIENT
Start: 2024-05-13 | End: 2024-05-14 | Stop reason: HOSPADM

## 2024-05-13 RX ORDER — DIPHENHYDRAMINE HYDROCHLORIDE 50 MG/ML
25 INJECTION INTRAMUSCULAR; INTRAVENOUS EVERY 6 HOURS PRN
Status: DISCONTINUED | OUTPATIENT
Start: 2024-05-13 | End: 2024-05-13 | Stop reason: HOSPADM

## 2024-05-13 RX ORDER — TRAMADOL HYDROCHLORIDE 50 MG/1
50 TABLET ORAL EVERY 4 HOURS PRN
Status: DISCONTINUED | OUTPATIENT
Start: 2024-05-13 | End: 2024-05-14 | Stop reason: HOSPADM

## 2024-05-13 RX ORDER — DIMENHYDRINATE 50 MG
50 TABLET ORAL ONCE
Status: DISCONTINUED | OUTPATIENT
Start: 2024-05-13 | End: 2024-05-14 | Stop reason: HOSPADM

## 2024-05-13 RX ORDER — SODIUM CHLORIDE, SODIUM LACTATE, POTASSIUM CHLORIDE, CALCIUM CHLORIDE 600; 310; 30; 20 MG/100ML; MG/100ML; MG/100ML; MG/100ML
INJECTION, SOLUTION INTRAVENOUS CONTINUOUS
Status: DISCONTINUED | OUTPATIENT
Start: 2024-05-13 | End: 2024-05-13

## 2024-05-13 RX ORDER — ONDANSETRON 4 MG/1
8 TABLET, ORALLY DISINTEGRATING ORAL EVERY 8 HOURS PRN
Status: DISCONTINUED | OUTPATIENT
Start: 2024-05-13 | End: 2024-05-14 | Stop reason: HOSPADM

## 2024-05-13 RX ORDER — HYDROMORPHONE HYDROCHLORIDE 2 MG/ML
INJECTION, SOLUTION INTRAMUSCULAR; INTRAVENOUS; SUBCUTANEOUS
Status: DISCONTINUED | OUTPATIENT
Start: 2024-05-13 | End: 2024-05-13

## 2024-05-13 RX ORDER — FENTANYL CITRATE 50 UG/ML
INJECTION, SOLUTION INTRAMUSCULAR; INTRAVENOUS
Status: DISCONTINUED | OUTPATIENT
Start: 2024-05-13 | End: 2024-05-13

## 2024-05-13 RX ORDER — KETOROLAC TROMETHAMINE 30 MG/ML
INJECTION, SOLUTION INTRAMUSCULAR; INTRAVENOUS
Status: DISCONTINUED | OUTPATIENT
Start: 2024-05-13 | End: 2024-05-13

## 2024-05-13 RX ORDER — SCOLOPAMINE TRANSDERMAL SYSTEM 1 MG/1
1 PATCH, EXTENDED RELEASE TRANSDERMAL ONCE
Status: COMPLETED | OUTPATIENT
Start: 2024-05-13 | End: 2024-05-13

## 2024-05-13 RX ORDER — HYDROMORPHONE HYDROCHLORIDE 2 MG/ML
0.5 INJECTION, SOLUTION INTRAMUSCULAR; INTRAVENOUS; SUBCUTANEOUS EVERY 5 MIN PRN
Status: DISCONTINUED | OUTPATIENT
Start: 2024-05-13 | End: 2024-05-13 | Stop reason: HOSPADM

## 2024-05-13 RX ORDER — MEPERIDINE HYDROCHLORIDE 25 MG/ML
25 INJECTION INTRAMUSCULAR; INTRAVENOUS; SUBCUTANEOUS EVERY 10 MIN PRN
Status: DISCONTINUED | OUTPATIENT
Start: 2024-05-13 | End: 2024-05-13 | Stop reason: HOSPADM

## 2024-05-13 RX ORDER — ONDANSETRON HYDROCHLORIDE 2 MG/ML
INJECTION, SOLUTION INTRAVENOUS
Status: DISCONTINUED | OUTPATIENT
Start: 2024-05-13 | End: 2024-05-13

## 2024-05-13 RX ORDER — MUPIROCIN 20 MG/G
OINTMENT TOPICAL 2 TIMES DAILY
Status: DISCONTINUED | OUTPATIENT
Start: 2024-05-13 | End: 2024-05-14 | Stop reason: HOSPADM

## 2024-05-13 RX ORDER — VECURONIUM BROMIDE FOR INJECTION 1 MG/ML
INJECTION, POWDER, LYOPHILIZED, FOR SOLUTION INTRAVENOUS
Status: DISCONTINUED | OUTPATIENT
Start: 2024-05-13 | End: 2024-05-13

## 2024-05-13 RX ORDER — DEXAMETHASONE SODIUM PHOSPHATE 4 MG/ML
INJECTION, SOLUTION INTRA-ARTICULAR; INTRALESIONAL; INTRAMUSCULAR; INTRAVENOUS; SOFT TISSUE
Status: DISCONTINUED | OUTPATIENT
Start: 2024-05-13 | End: 2024-05-13

## 2024-05-13 RX ORDER — PROPOFOL 10 MG/ML
VIAL (ML) INTRAVENOUS
Status: DISCONTINUED | OUTPATIENT
Start: 2024-05-13 | End: 2024-05-13

## 2024-05-13 RX ORDER — BISACODYL 10 MG/1
10 SUPPOSITORY RECTAL DAILY PRN
Status: DISCONTINUED | OUTPATIENT
Start: 2024-05-13 | End: 2024-05-14 | Stop reason: HOSPADM

## 2024-05-13 RX ADMIN — DEXAMETHASONE SODIUM PHOSPHATE 8 MG: 4 INJECTION, SOLUTION INTRA-ARTICULAR; INTRALESIONAL; INTRAMUSCULAR; INTRAVENOUS; SOFT TISSUE at 11:05

## 2024-05-13 RX ADMIN — IPRATROPIUM BROMIDE AND ALBUTEROL SULFATE 3 ML: 2.5; .5 SOLUTION RESPIRATORY (INHALATION) at 04:05

## 2024-05-13 RX ADMIN — TRAMADOL HYDROCHLORIDE 50 MG: 50 TABLET, COATED ORAL at 08:05

## 2024-05-13 RX ADMIN — MUPIROCIN: 20 OINTMENT TOPICAL at 10:05

## 2024-05-13 RX ADMIN — MORPHINE SULFATE 4 MG: 4 INJECTION INTRAVENOUS at 11:05

## 2024-05-13 RX ADMIN — MORPHINE SULFATE 4 MG: 4 INJECTION INTRAVENOUS at 06:05

## 2024-05-13 RX ADMIN — HYDROMORPHONE HYDROCHLORIDE 1 MG: 2 INJECTION, SOLUTION INTRAMUSCULAR; INTRAVENOUS; SUBCUTANEOUS at 12:05

## 2024-05-13 RX ADMIN — VECURONIUM BROMIDE 2 MG: 1 INJECTION, POWDER, LYOPHILIZED, FOR SOLUTION INTRAVENOUS at 12:05

## 2024-05-13 RX ADMIN — KETOROLAC TROMETHAMINE 30 MG: 30 INJECTION, SOLUTION INTRAMUSCULAR at 03:05

## 2024-05-13 RX ADMIN — FUROSEMIDE 10 MG: 10 INJECTION, SOLUTION INTRAMUSCULAR; INTRAVENOUS at 03:05

## 2024-05-13 RX ADMIN — SODIUM CHLORIDE 2 G: 9 INJECTION, SOLUTION INTRAVENOUS at 11:05

## 2024-05-13 RX ADMIN — SUGAMMADEX 200 MG: 100 INJECTION, SOLUTION INTRAVENOUS at 03:05

## 2024-05-13 RX ADMIN — SODIUM CHLORIDE, POTASSIUM CHLORIDE, SODIUM LACTATE AND CALCIUM CHLORIDE: 600; 310; 30; 20 INJECTION, SOLUTION INTRAVENOUS at 10:05

## 2024-05-13 RX ADMIN — MIDAZOLAM HYDROCHLORIDE 2 MG: 1 INJECTION, SOLUTION INTRAMUSCULAR; INTRAVENOUS at 11:05

## 2024-05-13 RX ADMIN — VECURONIUM BROMIDE 1 MG: 1 INJECTION, POWDER, LYOPHILIZED, FOR SOLUTION INTRAVENOUS at 02:05

## 2024-05-13 RX ADMIN — ONDANSETRON 8 MG: 2 INJECTION INTRAMUSCULAR; INTRAVENOUS at 11:05

## 2024-05-13 RX ADMIN — FENTANYL CITRATE 100 MCG: 50 INJECTION INTRAMUSCULAR; INTRAVENOUS at 11:05

## 2024-05-13 RX ADMIN — ROCURONIUM BROMIDE 40 MG: 10 INJECTION, SOLUTION INTRAVENOUS at 11:05

## 2024-05-13 RX ADMIN — SCOPALAMINE 1 PATCH: 1 PATCH, EXTENDED RELEASE TRANSDERMAL at 11:05

## 2024-05-13 RX ADMIN — DEXTROSE MONOHYDRATE 1 G: 5 INJECTION INTRAVENOUS at 08:05

## 2024-05-13 RX ADMIN — PROPOFOL 200 MG: 10 INJECTION, EMULSION INTRAVENOUS at 11:05

## 2024-05-13 RX ADMIN — BISACODYL 10 MG: 10 SUPPOSITORY RECTAL at 09:05

## 2024-05-13 RX ADMIN — HYDROMORPHONE HYDROCHLORIDE 0.5 MG: 2 INJECTION, SOLUTION INTRAMUSCULAR; INTRAVENOUS; SUBCUTANEOUS at 02:05

## 2024-05-13 RX ADMIN — LIDOCAINE HYDROCHLORIDE 60 MG: 20 INJECTION, SOLUTION INTRAVENOUS at 11:05

## 2024-05-13 RX ADMIN — VECURONIUM BROMIDE 2 MG: 1 INJECTION, POWDER, LYOPHILIZED, FOR SOLUTION INTRAVENOUS at 01:05

## 2024-05-13 RX ADMIN — ONDANSETRON 8 MG: 4 TABLET, ORALLY DISINTEGRATING ORAL at 08:05

## 2024-05-13 RX ADMIN — MEPERIDINE HYDROCHLORIDE 25 MG: 25 INJECTION, SOLUTION INTRAMUSCULAR; INTRAVENOUS; SUBCUTANEOUS at 04:05

## 2024-05-13 RX ADMIN — HYDROMORPHONE HYDROCHLORIDE 0.5 MG: 2 INJECTION, SOLUTION INTRAMUSCULAR; INTRAVENOUS; SUBCUTANEOUS at 12:05

## 2024-05-13 RX ADMIN — ROCURONIUM BROMIDE 10 MG: 10 INJECTION, SOLUTION INTRAVENOUS at 11:05

## 2024-05-13 NOTE — ANESTHESIA PREPROCEDURE EVALUATION
05/13/2024  Madelaine Palacio is a 49 y.o., female.      Pre-op Assessment    I have reviewed the Patient Summary Reports.     I have reviewed the Nursing Notes. I have reviewed the NPO Status.   I have reviewed the Medications.     Review of Systems  Anesthesia Hx:             Denies Family Hx of Anesthesia complications.   Personal Hx of Anesthesia complications, Post-Operative Nausea/Vomiting                    Social:  Non-Smoker, No Alcohol Use       Cardiovascular:     Hypertension                                  Hypertension         Pulmonary:    Asthma       Asthma:               Musculoskeletal:  Musculoskeletal Normal                    Physical Exam  General: Well nourished, Cooperative, Alert and Oriented    Airway:  Mallampati: II / II  Mouth Opening: Normal  TM Distance: Normal  Neck ROM: Normal ROM    Dental:  Intact    Chest/Lungs:  Clear to auscultation    Heart:  Rate: Normal  Rhythm: Regular Rhythm  Sounds: Normal        Chemistry        Component Value Date/Time     05/07/2024 0917    K 4.0 05/07/2024 0917     (H) 05/07/2024 0917    CO2 28 05/07/2024 0917    BUN 10 05/07/2024 0917    CREATININE 0.72 05/07/2024 0917    GLU 92 05/07/2024 0917        Component Value Date/Time    CALCIUM 9.0 05/07/2024 0917    ALKPHOS 98 05/07/2024 0917    AST 16 05/07/2024 0917    ALT 29 05/07/2024 0917    BILITOT 0.2 05/07/2024 0917    ESTGFRAFRICA 134 12/16/2021 1121        Lab Results   Component Value Date    WBC 5.95 05/07/2024    HGB 7.8 (L) 05/07/2024    HCT 28.4 (L) 05/07/2024     (H) 05/07/2024     Results for orders placed or performed in visit on 03/05/24   EKG 12-lead    Collection Time: 03/05/24  8:39 AM   Result Value Ref Range    QRS Duration 84 ms    [facility] QTC Calculation 444 ms    Narrative    Test Reason : Z01.818,    Vent. Rate : 070 BPM     Atrial Rate : 070 BPM     P-R Int :  162 ms          QRS Dur : 084 ms      QT Int : 412 ms       P-R-T Axes : 065 085 007 degrees     QTc Int : 444 ms    Normal sinus rhythm  Normal ECG  No previous ECGs available  Confirmed by Jose Mohamud DO ([id number]) on 3/11/2024 2:47:53 PM    Referred By: DANIEL ARELLANO           Confirmed By:Jose Mohamud DO         Anesthesia Plan  Type of Anesthesia, risks & benefits discussed:    Anesthesia Type: Gen ETT  Intra-op Monitoring Plan: Standard ASA Monitors  Post Op Pain Control Plan: multimodal analgesia  Induction:  IV  Airway Plan: Direct  Informed Consent: Informed consent signed with the Patient and all parties understand the risks and agree with anesthesia plan.  All questions answered.   ASA Score: 2  Day of Surgery Review of History & Physical: H&P Update referred to the surgeon/provider.I have interviewed and examined the patient. I have reviewed the patient's H&P dated: There are no significant changes.     Ready For Surgery From Anesthesia Perspective.     .

## 2024-05-13 NOTE — PLAN OF CARE
Problem: Adult Inpatient Plan of Care  Goal: Absence of Hospital-Acquired Illness or Injury  Outcome: Progressing     Problem: Adult Inpatient Plan of Care  Goal: Optimal Comfort and Wellbeing  Outcome: Progressing     Problem: Infection  Goal: Absence of Infection Signs and Symptoms  Outcome: Progressing     Problem: Wound  Goal: Optimal Coping  Outcome: Progressing  Goal: Optimal Functional Ability  Outcome: Progressing  Goal: Absence of Infection Signs and Symptoms  Outcome: Progressing  Goal: Improved Oral Intake  Outcome: Progressing  Goal: Optimal Pain Control and Function  Outcome: Progressing  Goal: Skin Health and Integrity  Outcome: Progressing  Goal: Optimal Wound Healing  Outcome: Progressing      Subjective   Patient ID: Darrell is a 45 year old male.    Chief Complaint   Patient presents with   • Office Visit     F/u on Bp     Here for follow up on b/p. Pt verbalizes feeling fine. Denies any symptoms.  Pt needs to purchase a new machine for home monitoring. Old machine was brought in and deemed malfunctioned. Kept giving really high readings. Today b/p is wnl.         Patient Active Problem List   Diagnosis   • Pure hypertriglyceridemia   • Hypertension   • Exposure to COVID-19 virus   • Current smoker       MEDICATIONS:  Current Outpatient Medications   Medication Sig   • amLODIPine (NORVASC) 5 MG tablet Take 1 tablet by mouth daily.     No current facility-administered medications for this visit.       ALLERGIES:  ALLERGIES:  No Known Allergies      SOCIAL HISTORY:  Social History     Tobacco Use   • Smoking status: Current Every Day Smoker     Packs/day: 0.00   • Smokeless tobacco: Current User   Substance Use Topics   • Alcohol use: Yes   • Drug use: Not on file         Review of Systems   Constitutional: Negative for activity change and appetite change.   HENT: Negative for congestion, rhinorrhea and sore throat.    Eyes: Negative for visual disturbance.   Respiratory: Negative for apnea and cough.    Cardiovascular: Negative for chest pain.   Gastrointestinal: Negative for constipation and diarrhea.   Genitourinary: Negative for difficulty urinating, frequency and urgency.   Skin: Negative for rash.   Neurological: Negative for weakness.   Psychiatric/Behavioral: Negative for agitation and behavioral problems.   All other systems reviewed and are negative.      Objective   Physical Exam  Vitals reviewed.   Constitutional:       Appearance: Normal appearance.   HENT:      Head: Normocephalic.      Right Ear: External ear normal.      Left Ear: External ear normal.      Nose: Nose normal.      Mouth/Throat:      Pharynx: Oropharynx is clear.   Cardiovascular:      Rate and Rhythm: Normal rate and regular  rhythm.      Pulses: Normal pulses.      Heart sounds: Normal heart sounds.   Pulmonary:      Effort: Pulmonary effort is normal.      Breath sounds: Normal breath sounds.   Abdominal:      General: Bowel sounds are normal.      Palpations: Abdomen is soft.   Musculoskeletal:      Cervical back: Normal range of motion.   Neurological:      Mental Status: He is alert and oriented to person, place, and time.       Visit Vitals  /86 (BP Location: LUE - Left upper extremity, Patient Position: Sitting)   Pulse 98   Temp 97.1 °F (36.2 °C) (Tympanic)   Ht 5' 9\" (1.753 m)   Wt 57.5 kg (126 lb 10.5 oz)   SpO2 97%   BMI 18.70 kg/m²       Assessment   Problem List Items Addressed This Visit        Circulatory    Hypertension - Primary     Taking medication as prescribed. Will monitor at home and return with data at next regularly scheduled visit.                Reviewed COVID guidelines.  Advised to avoid contact with others and use caution when in public places. Avoid crowds, wash hands frequently, and wear a mask when in public.    Patient was advised to call if they experience any new or worsening symptoms.    Return in about 3 months (around 9/4/2021) for PCP Well Visit.

## 2024-05-13 NOTE — ANESTHESIA PROCEDURE NOTES
Intubation    Date/Time: 5/13/2024 11:37 AM    Performed by: Yvon Ruiz CRNA  Authorized by: Eugene Macias MD    Intubation:     Induction:  Intravenous    Intubated:  Postinduction    Mask Ventilation:  Easy mask    Attempts:  1    Attempted By:  CRNA    Method of Intubation:  Direct    Blade:  Erendira 3    Laryngeal View Grade: Grade IIA - cords partially seen      Difficult Airway Encountered?: No      Complications:  None    Airway Device:  Oral endotracheal tube    Airway Device Size:  7.0    Style/Cuff Inflation:  Cuffed (inflated to minimal occlusive pressure)    Tube secured:  22    Secured at:  The teeth    Placement Verified By:  Capnometry    Complicating Factors:  None    Findings Post-Intubation:  BS equal bilateral and atraumatic/condition of teeth unchanged

## 2024-05-13 NOTE — OR NURSING
REC'D TO PACU IN STABLE CONDITION. VSS. SATS 100% ON 6L/FM. WILL TITRATE O2 ATOL. DENIES PAIN AT THIS TIME. DSG TO ABD x4 C/D/I. WILL CONT TO MONITOR.    1608 PT CARE RELEASED TO MATHEUS [facility]ANCE.

## 2024-05-13 NOTE — TRANSFER OF CARE
"Anesthesia Transfer of Care Note    Patient: Madelaine Palacio    Procedure(s) Performed: Procedure(s) (LRB):  XI ROBOTIC HYSTERECTOMY,WITH SALPINGO-OOPHORECTOMY (Bilateral)  CYSTOSCOPY (N/A)    Patient location: PACU    Anesthesia Type: general    Transport from OR: Transported from OR on 2-3 L/min O2 by NC with adequate spontaneous ventilation    Post pain: adequate analgesia    Post assessment: no apparent anesthetic complications and tolerated procedure well    Post vital signs: stable    Level of consciousness: responds to stimulation and sedated    Nausea/Vomiting: no nausea/vomiting    Complications: none    Transfer of care protocol was followedComments: VSS. NAD. Warming convection blanket and warm blankets to full body.       Last vitals: Visit Vitals  BP (!) 149/83 (BP Location: Right arm, Patient Position: Lying)   Pulse 77   Temp 36.6 °C (97.8 °F) (Oral)   Resp 14   Ht 5' 5" (1.651 m)   Wt 86.2 kg (190 lb)   SpO2 100%   Breastfeeding No   BMI 31.62 kg/m²     "

## 2024-05-13 NOTE — PROGRESS NOTES
Postop note     Patient is alert.    Surgery discussed.  She understands right ovary had to be removed because of adhesions.  Left ovary left intact.    She is tolerating liquids well.    Will progress diet as tolerated.    She is already ambulated to the bathroom and voided.    Vital signs stable at present.  Postoperative hematocrit pending at present.      May progress diet as tolerated.    Continue present management.

## 2024-05-13 NOTE — BRIEF OP NOTE
Patient had robotically assisted hysterectomy with right salpingo-oophorectomy.  Left ovary was left fallopian tube was taken.      Procedure complicated by large uterine leiomyoma    Robotic hysterectomy went well however there was difficulty in morcellation of the uterus vaginally.  Vaginal dissection and morcellation took a proximally 1 hour  for removal of the uterus.    However patient did well estimated blood loss 150 cc.      Cystoscopy done postprocedure revealed no evidence of bladder injury efflux of yellow orange urine was noted through each ureteral orifice.    Good hemostasis noted upon closing.

## 2024-05-13 NOTE — OP NOTE
Dr. Almanzar 05/13/2024.    Preoperative diagnosis uterine leiomyoma menometrorrhagia resulting anemia.    Postoperative diagnosis same.      Findings large irregular shaped uterus with numerous fundal leiomyoma.  Total uterine size of proximally 14 week.    Procedure robotically assisted hysterectomy with removal of right ovary.  Bilateral salpingectomy.  Left ovary left intact.    How the procedure was performed     Patient was placed under general anesthesia by anesthesiologist.    She was prepped and draped in usual manner for abdominal and vaginal surgery.  She was given 2  gms of Ancef. Time-out was called patient and procedure reviewed.  Bladder was drained with a Washington.  Speculum inserted.  Upper lip of the cervix grasped with a sharp tooth tenaculum.    The uterus and cervix were sounded to 12 Cm.  Cervix dilated to a 8mm Hegar dilator.    0 Vicryl stick ties were placed at 3 and 9:00 a.m. of the cervix and tagged    10 cm intrauterine manipulator with cervical ring was placed in usual manner.  Intrauterine bulb insufflated following by insufflation of the vaginal bulb.    Physician re gloved.  Attention was turned abdominally.      A 2 cm incision was made just above the umbilicus.  Abdomen was lifted upward with towel clamps.  Varies needle was inserted without difficulty.  CO2 applied at 6 L per minute  The abdomen was insufflated without difficulty.  Intra-abdominal pressure remained less than 15 mm of mercury throughout insufflation.    8 mm trocar was inserted without difficulty.  Scope inserted through sleep noted to be intra-abdominally.  No evidence of abdominal or pelvic adhesions.  1 cm incisions were made in the left upper quadrant, right upper quadrant and right mid quadrant.  Thereafter 8 mm trocars were then inserted under direct visualization.      The [employer] robot was then docked from  a left lateral side position. The #2 Laparoscopic arm attached to the supraumbilical port.  The #1 arm was   attached to the left upper quadrant port .  The #3 arm was attached to the right upper quadrant port and the #4 arm was attached to the right  lateral quadrant port.    Through the #1 arm tissue vessel sealer was inserted. Through the  #3 arm monopolar cautery scissors were inserted and through the #4 arm bipolar fenestrated forceps were inserted.    Instruments were then directed to the fundal aspect of the uterus under direct visualization.  I then assumed control at the console    My vaginal assistant then deviated the uterus up into the patient's right.  I  used the fenestrated forceps to lift the left ovary upward the left ovary appeared normal.  The left fallopian tube was released by clamping beneath the fimbria of the left tube cauterized the mesosalpinx and transection with tissue vessel sealer.  This was carried up to the cornual aspect of the uterus.  I then continued the dissection across the left utero-ovarian pedicle and left round ligament.  Dissection was carried down the upper 2/3 of the broad ligament.        Attention was then turned to the right side.  The right ovary was densely adhered to the right posterior leaf of the broad ligament.  It was also attached to the lateral aspect of the uterus.      I could see that the right ovary could not be left.  I was able to identify the infundibulopelvic vessels leading into the ovary on the right side.  These were cauterized with tissue vessel sealer and transected.  I continued the dissection up the mesosalpinx on the right and across the round ligament on the right.  The dissection was carried with tissue vessel sealer down the upper 2/3 of the broad ligament on right.      At this time I carefully begin developing the bladder flap anteriorly by alternating cauterization with monopolar cautery scissors and dissecting bluntly with tissue vessel sealer developing the bladder flap.  When adequate dissection of the bladder flap was done I did perform an  anterior colpotomy with monopolar cautery scissors.  Dissection was carried from 12-3 o'clock and 12-9 o'clock around the anterior cervix.         My vaginal assistant then deviated the uterus anteriorly and all instruments were placed in the posterior cul-de-sac.  I used the fenestrated forceps to push upward on the uterine fundus.  The cervical ring was easily seen posteriorly.  I then cauterized along the upper bevel of the cervical ring posteriorly beginning in the midline and dissecting to the right and left side around the ring.    I then had my assistant deviate the uterus upward and to the patient's right putting stretch on the left uterine vessels.  Using tissue vessel sealer the uterine vessels along the lower uterine segment and cervix were grasped cauterized and transected with enclosed blade. The dissection was carried down the left lateral aspect of the lower uterine segment and cervix alternating with tissue vessel sealer and monopolar cautery scissors until the cervix was completely released from its cervical attachment on the patient's left.    Attention was then turned to the patient's right as my vaginal assistant deviated the uterus to the left putting stretch on the right uterine vessels.  Uterine vessels were grasped with tissue vessel sealer cauterized and transected with enclosed blade.  Dissection was carried down the lateral aspect of the right lower uterine segment and cervix alternating with tissue vessel sealer and monopolar cautery scissors until the cervix was completely released from its vaginal attachment on the right.    My vaginal assistant pull the uterus down but could not deliver the uterus through the vaginal cuff because of large uterine leiomyoma.      I asked that instruments be removed and I proceeded to re gloved and gowned to assist in a vaginal morcellation.    It should be noted because of fairly small vaginal vault enlarged uterine leiomyoma morcellation of the uterus  vaginally was difficult.  It took me a proximally 1 hour to completely remove the uterus in fragments vaginally.  However this was ultimately successfully done alternating between scalpel and Donovan scissors for morcellation of uterine body.      I then replaced the vaginal bulb.  I then assumed position at the console.      My abdominal assistant then removed the monopolar scissors and replace this with a ProGrasp instrument.  The tissue vessel sealer was removed and my abdominal assistant placed a suction irrigation apparatus to thoroughly irrigate and suction the vaginal cuff area.  Thereafter a 0 Vicryl suture was passed through the left upper quadrant port and needle  inserted.    I lifted upward on the bladder flap peritoneum with fenestrated bipolar forceps.  I then used the ProGrasp instrument to lift upward on the anterior aspect of the vaginal cuff.  The left lateral aspect of the vaginal cuff was then closed using interrupted number 0 Vicryl sutures.  Two sutures were placed along the left lateral aspect of the vaginal cuff.  This needle was removed as a 2nd suture was passed.  The 2nd suture was used to place interrupted sutures along the right lateral aspect of the vaginal cuff and tied manually.  Second needle was removed as a 3rd suture was passed.  The 3rd suture was used to continue closing the vaginal cuff in the midportion with interrupted sutures tied manually.  Third needle was removed.  The pelvis was then thoroughly irrigated and suctioned with saline.  Examined under low pressure  Excellent hemostasis was noted.  A 4th suture was passed.  The bladder flap peritoneum was then tacked to the posterior peritoneum across the vaginal cuff area with a figure-of-eight 0 Vicryl suture.  Again excellent hemostasis was noted.    Instruments were removed and CO2 allowed to escape the camera was shut off.    I then re scrubbed, re gowned and gloved.    I  performed a cystoscopic examination.  There  was no evidence of bladder injury a flux of yellow orange urine was noted through each ureteral orifice.  Patient had been given Pyridium prior to the procedure.  Washington catheter was reinserted.    Attention was then turned abdominally.  Physician re gloved.  Ports were removed CO2 allowed to escape.  Skin incisions were reapproximated with interrupted number 4-0 Vicryl subcuticular stitches.    Estimated blood loss 150 cc      She was returned recovery room in good condition.

## 2024-05-13 NOTE — OR NURSING
1609 Assumed care of pt from DIANNA Cole RN. Abd lap sites x4 and jen pad C/D/I. Washington to gravity patent, secure, intact. No needs. Will continue to monitor.     1620 Tremors noted. IV demerol given, see MAR for admin.     1625 Washington catheter removed per order. 10 ml saline aspirated from bulb, catheter removed in one continuous motion. Catheter intact. Pt tolerated well.     1637 Out of PACU. VSS. No signs of bleeding/distress noted. Family notified of transfer to room.     1645 Pt to room 453 drowsy but arousable to verbal stimuli. No signs of distress noted, respirations even and unlabored. No visitors at bedside. Bedside report given to JENY Bateman RN. Moved pt to regular bed with safety precautions in place. Abd lap sites x4 and jen pad C/D/I. Denies pain/needs. /85, P 69, R 16, O2 100% RA, T 97.4 oral.

## 2024-05-14 VITALS
HEIGHT: 65 IN | HEART RATE: 54 BPM | WEIGHT: 190 LBS | DIASTOLIC BLOOD PRESSURE: 70 MMHG | TEMPERATURE: 98 F | BODY MASS INDEX: 31.65 KG/M2 | RESPIRATION RATE: 18 BRPM | SYSTOLIC BLOOD PRESSURE: 117 MMHG | OXYGEN SATURATION: 99 %

## 2024-05-14 DIAGNOSIS — Z86.2 HISTORY OF ANEMIA: ICD-10-CM

## 2024-05-14 DIAGNOSIS — G89.18 POSTOPERATIVE PAIN: Primary | ICD-10-CM

## 2024-05-14 PROBLEM — N92.4 EXCESSIVE BLEEDING IN PREMENOPAUSAL PERIOD: Status: ACTIVE | Noted: 2024-05-14

## 2024-05-14 PROBLEM — N92.4 EXCESSIVE BLEEDING IN PREMENOPAUSAL PERIOD: Status: RESOLVED | Noted: 2024-05-14 | Resolved: 2024-05-14

## 2024-05-14 PROBLEM — D25.9 UTERINE LEIOMYOMA: Status: RESOLVED | Noted: 2023-08-08 | Resolved: 2024-05-14

## 2024-05-14 LAB
ANISOCYTOSIS BLD QL SMEAR: ABNORMAL
BASOPHILS # BLD AUTO: 0.02 K/UL (ref 0–0.2)
BASOPHILS NFR BLD AUTO: 0.1 % (ref 0–1)
DIFFERENTIAL METHOD BLD: ABNORMAL
EOSINOPHIL # BLD AUTO: 0 K/UL (ref 0–0.5)
EOSINOPHIL NFR BLD AUTO: 0 % (ref 1–4)
ERYTHROCYTE [DISTWIDTH] IN BLOOD BY AUTOMATED COUNT: 17.7 % (ref 11.5–14.5)
HCT VFR BLD AUTO: 25.5 % (ref 38–47)
HCT VFR BLD AUTO: 28.1 % (ref 38–47)
HGB BLD-MCNC: 7.1 G/DL (ref 12–16)
HGB BLD-MCNC: 7.8 G/DL (ref 12–16)
HYPOCHROMIA BLD QL SMEAR: ABNORMAL
IMM GRANULOCYTES # BLD AUTO: 0.09 K/UL (ref 0–0.04)
IMM GRANULOCYTES NFR BLD: 0.5 % (ref 0–0.4)
LYMPHOCYTES # BLD AUTO: 1.16 K/UL (ref 1–4.8)
LYMPHOCYTES NFR BLD AUTO: 6.8 % (ref 27–41)
MCH RBC QN AUTO: 18.5 PG (ref 27–31)
MCHC RBC AUTO-ENTMCNC: 27.8 G/DL (ref 32–36)
MCV RBC AUTO: 66.4 FL (ref 80–96)
MICROCYTES BLD QL SMEAR: ABNORMAL
MONOCYTES # BLD AUTO: 1.41 K/UL (ref 0–0.8)
MONOCYTES NFR BLD AUTO: 8.3 % (ref 2–6)
MPC BLD CALC-MCNC: 9.4 FL (ref 9.4–12.4)
NEUTROPHILS # BLD AUTO: 14.35 K/UL (ref 1.8–7.7)
NEUTROPHILS NFR BLD AUTO: 84.3 % (ref 53–65)
NRBC # BLD AUTO: 0 X10E3/UL
NRBC, AUTO (.00): 0 %
OVALOCYTES BLD QL SMEAR: ABNORMAL
PLATELET # BLD AUTO: 454 K/UL (ref 150–400)
PLATELET MORPHOLOGY: ABNORMAL
POLYCHROMASIA BLD QL SMEAR: ABNORMAL
RBC # BLD AUTO: 3.84 M/UL (ref 4.2–5.4)
WBC # BLD AUTO: 17.03 K/UL (ref 4.5–11)

## 2024-05-14 PROCEDURE — 85014 HEMATOCRIT: CPT | Performed by: OBSTETRICS & GYNECOLOGY

## 2024-05-14 PROCEDURE — 63600175 PHARM REV CODE 636 W HCPCS: Performed by: OBSTETRICS & GYNECOLOGY

## 2024-05-14 PROCEDURE — 25000003 PHARM REV CODE 250: Performed by: OBSTETRICS & GYNECOLOGY

## 2024-05-14 PROCEDURE — 85025 COMPLETE CBC W/AUTO DIFF WBC: CPT | Performed by: OBSTETRICS & GYNECOLOGY

## 2024-05-14 PROCEDURE — 36415 COLL VENOUS BLD VENIPUNCTURE: CPT | Performed by: OBSTETRICS & GYNECOLOGY

## 2024-05-14 RX ORDER — FERROUS SULFATE 324(65)MG
324 TABLET, DELAYED RELEASE (ENTERIC COATED) ORAL 2 TIMES DAILY
Qty: 30 TABLET | Refills: 2 | Status: SHIPPED | OUTPATIENT
Start: 2024-05-14

## 2024-05-14 RX ORDER — TRAMADOL HYDROCHLORIDE 50 MG/1
50 TABLET ORAL EVERY 6 HOURS
Qty: 10 TABLET | Refills: 0 | Status: SHIPPED | OUTPATIENT
Start: 2024-05-14

## 2024-05-14 RX ADMIN — MUPIROCIN: 20 OINTMENT TOPICAL at 07:05

## 2024-05-14 RX ADMIN — TRAMADOL HYDROCHLORIDE 50 MG: 50 TABLET, COATED ORAL at 02:05

## 2024-05-14 RX ADMIN — TRAMADOL HYDROCHLORIDE 50 MG: 50 TABLET, COATED ORAL at 01:05

## 2024-05-14 RX ADMIN — MORPHINE SULFATE 4 MG: 4 INJECTION INTRAVENOUS at 07:05

## 2024-05-14 RX ADMIN — TRAMADOL HYDROCHLORIDE 50 MG: 50 TABLET, COATED ORAL at 05:05

## 2024-05-14 RX ADMIN — TRAMADOL HYDROCHLORIDE 50 MG: 50 TABLET, COATED ORAL at 11:05

## 2024-05-14 RX ADMIN — FAMOTIDINE 20 MG: 20 TABLET, FILM COATED ORAL at 07:05

## 2024-05-14 RX ADMIN — MORPHINE SULFATE 4 MG: 4 INJECTION INTRAVENOUS at 03:05

## 2024-05-14 NOTE — NURSING
Sn instructed pt of her discharge instructions and pt aware of prescriptions , medications to take and follow up appts along with after care instructions.  Iv dcd earlier this shift. Nc voiced. Pt awaiting transport at this time.

## 2024-05-14 NOTE — ANESTHESIA POSTPROCEDURE EVALUATION
Anesthesia Post Evaluation    Patient: Madelaine Palacio    Procedure(s) Performed: Procedure(s) (LRB):  XI ROBOTIC HYSTERECTOMY,WITH SALPINGO-OOPHORECTOMY (Bilateral)  CYSTOSCOPY (N/A)    Final Anesthesia Type: general      Patient location during evaluation: PACU  Patient participation: Yes- Able to Participate  Level of consciousness: awake and alert and oriented  Post-procedure vital signs: reviewed and stable  Pain management: adequate  Airway patency: patent  MITCHEL mitigation strategies: Multimodal analgesia  PONV status at discharge: No PONV  Anesthetic complications: no      Cardiovascular status: hemodynamically stable  Respiratory status: unassisted and spontaneous ventilation  Hydration status: euvolemic  Follow-up not needed.              Vitals Value Taken Time   /70 05/14/24 0729   Temp 37 °C (98.6 °F) 05/14/24 0729   Pulse 77 05/14/24 0729   Resp 16 05/14/24 0745   SpO2 98 % 05/14/24 0729         Event Time   Out of Recovery 16:37:06         Pain/Luis Score: Pain Rating Prior to Med Admin: 8 (5/14/2024  7:45 AM)  Pain Rating Post Med Admin: 4 (5/14/2024  4:08 AM)  Luis Score: 8 (5/13/2024  4:25 PM)

## 2024-05-14 NOTE — PLAN OF CARE
Problem: Adult Inpatient Plan of Care  Goal: Plan of Care Review  Outcome: Progressing  Goal: Patient-Specific Goal (Individualized)  Outcome: Progressing  Goal: Absence of Hospital-Acquired Illness or Injury  Outcome: Progressing  Goal: Optimal Comfort and Wellbeing  Outcome: Progressing  Goal: Readiness for Transition of Care  Outcome: Progressing     Problem: Infection  Goal: Absence of Infection Signs and Symptoms  Outcome: Progressing     Problem: Wound  Goal: Optimal Coping  Outcome: Progressing  Goal: Optimal Functional Ability  Outcome: Progressing  Goal: Absence of Infection Signs and Symptoms  Outcome: Progressing  Goal: Improved Oral Intake  Outcome: Progressing  Goal: Optimal Pain Control and Function  Outcome: Progressing  Goal: Skin Health and Integrity  Outcome: Progressing  Goal: Optimal Wound Healing  Outcome: Progressing      Education Record    Learner:  Patient    Disease / Diagnosis:Dehydration    Barriers / Limitations:  None   Comments:    Method:  Discussion   Comments:    General Topics:  Plan of care reviewed   Comments:    Outcome:  Shows understanding   Comments:Pt re

## 2024-05-14 NOTE — PROGRESS NOTES
Postoperative day 1.      Patient alert.  Surgery re discussed this morning.      Ambulating to the bathroom well without dizziness.  Voiding well.      Tolerating liquids.      Discussed progression to regular diet.      Lungs are clear abdomen soft bowel sounds present.      Postoperative hematocrit 25%.  However preoperative hematocrit only 28%.    Discussed ambulation in the room this morning.  May shower later this morning with assistance.    Progressed to regular diet.    Discussed probable discharge home this afternoon if continues to do well.

## 2024-05-14 NOTE — DISCHARGE SUMMARY
Patient ambulating halls.  Tolerated regular diet for breakfast and lunch.      Hematocrit at 11:00 a.m. had risen to 28%.    On examination lungs remain clear abdomen soft incisions dry bowel sounds present.    Discussed discharge home.  Sedentary activity instructions given.  She understands no sexual relations.      No driving until after follow-up in 2 weeks.      She was instructed to return to the emergency room if any chills fever vomiting or heavy vaginal bleeding occurs.    Pathology report pending at discharge    She was prescribed Ultram 50 mg 1q 6 hours p.r.n. for pain.  She will also continue iron supplements twice daily.    Follow-up appointment in my office in 2 weeks.

## 2024-05-15 ENCOUNTER — NURSE TRIAGE (OUTPATIENT)
Dept: ADMINISTRATIVE | Facility: CLINIC | Age: 49
End: 2024-05-15

## 2024-05-15 LAB
ESTROGEN SERPL-MCNC: NORMAL PG/ML
INSULIN SERPL-ACNC: NORMAL U[IU]/ML
LAB AP GROSS DESCRIPTION: NORMAL
LAB AP LABORATORY NOTES: NORMAL
T3RU NFR SERPL: NORMAL %

## 2024-05-15 NOTE — TELEPHONE ENCOUNTER
Pt calling and saying that she hasn't had a BM since Friday and that she had a Hysterectomy and just D/C 'd yesterday. Pt told not to strain but just started passing gas. Pt told the her colon is waking up but triaged and care advice to be seen within 24 hours and told to try a suppository or mirolax to see if that would help her and if any trouble to call us back. Pt told that I will route message to provider and they can reach out tomorrow. Pt will call if any abdominal pain constant for over 2 hours, or other questions or concerns                   Reason for Disposition   Last bowel movement (BM) > 4 days ago    Additional Information   Negative: [1] Vomiting AND [2] contains bile (green color)   Negative: Patient sounds very sick or weak to the triager   Negative: [1] Vomiting AND [2] abdomen looks much more swollen than usual   Negative: [1] Constant abdominal pain AND [2] present > 2 hours   Negative: [1] Rectal pain or fullness from fecal impaction (rectum full of stool) AND [2] NOT better after SITZ bath, suppository or enema   Negative: [1] Intermittent mild abdominal pain AND [2] fever   Negative: Abdomen is more swollen than usual    Protocols used: Constipation-A-AH

## 2024-05-16 ENCOUNTER — TELEPHONE (OUTPATIENT)
Dept: OBSTETRICS AND GYNECOLOGY | Facility: CLINIC | Age: 49
End: 2024-05-16
Payer: MEDICAID

## 2024-05-16 NOTE — TELEPHONE ENCOUNTER
Verified ; pt stated she called early this morning and spoke with someone because she has not had BM in 4 days. Pt did state she had one this morning and she had to strain to get it to pass. In patient we do not want her to strain because it could cause some problems right after surgery; informed she need to get some magnesium citrate to drink and after she have a good BM from that she need to take a stool softener daily and drink plenty of water to help keep stool soft. Patient stated she will get her mom to  items. I informed patient to sit straight up with feet on propped up on stool. Pt voiced understanding and agreed.

## 2024-05-20 ENCOUNTER — TELEPHONE (OUTPATIENT)
Dept: OBSTETRICS AND GYNECOLOGY | Facility: CLINIC | Age: 49
End: 2024-05-20
Payer: MEDICAID

## 2024-05-20 NOTE — TELEPHONE ENCOUNTER
----- Message from Caroline Abrams sent at 2024 11:14 AM CDT -----  Regarding: RETURN CALL  PATIENT CALL AND WOULD LIKE A CALL BACK, CALL BACK NUMBER -[phone]      Verified ; patient stated she is still having trouble having BM and she had 1 bottle of magnesium citrate; would like for Dr. Almanzar to give her a call.

## 2024-05-23 ENCOUNTER — OFFICE VISIT (OUTPATIENT)
Dept: OBSTETRICS AND GYNECOLOGY | Facility: CLINIC | Age: 49
End: 2024-05-23
Payer: MEDICAID

## 2024-05-23 VITALS — DIASTOLIC BLOOD PRESSURE: 88 MMHG | SYSTOLIC BLOOD PRESSURE: 160 MMHG

## 2024-05-23 DIAGNOSIS — Z09 POSTOP CHECK: Primary | ICD-10-CM

## 2024-05-23 DIAGNOSIS — R30.9 PAIN WITH URINATION: ICD-10-CM

## 2024-05-23 LAB
BILIRUB UR QL STRIP: NEGATIVE
CLARITY UR: ABNORMAL
COLOR UR: YELLOW
GLUCOSE UR STRIP-MCNC: NORMAL MG/DL
KETONES UR STRIP-SCNC: NEGATIVE MG/DL
LEUKOCYTE ESTERASE UR QL STRIP: NEGATIVE
MUCOUS, UA: ABNORMAL /LPF
NITRITE UR QL STRIP: NEGATIVE
PH UR STRIP: 6 PH UNITS
PROT UR QL STRIP: 30
RBC # UR STRIP: NEGATIVE /UL
RBC #/AREA URNS HPF: 4 /HPF
SP GR UR STRIP: 1.02
SQUAMOUS #/AREA URNS LPF: ABNORMAL /HPF
UROBILINOGEN UR STRIP-ACNC: NORMAL MG/DL
WBC #/AREA URNS HPF: 3 /HPF

## 2024-05-23 PROCEDURE — 87086 URINE CULTURE/COLONY COUNT: CPT | Mod: ,,, | Performed by: CLINICAL MEDICAL LABORATORY

## 2024-05-23 PROCEDURE — 3079F DIAST BP 80-89 MM HG: CPT | Mod: CPTII,,, | Performed by: OBSTETRICS & GYNECOLOGY

## 2024-05-23 PROCEDURE — 99024 POSTOP FOLLOW-UP VISIT: CPT | Mod: ,,, | Performed by: OBSTETRICS & GYNECOLOGY

## 2024-05-23 PROCEDURE — 3077F SYST BP >= 140 MM HG: CPT | Mod: CPTII,,, | Performed by: OBSTETRICS & GYNECOLOGY

## 2024-05-23 PROCEDURE — 81001 URINALYSIS AUTO W/SCOPE: CPT | Mod: ,,, | Performed by: CLINICAL MEDICAL LABORATORY

## 2024-05-23 PROCEDURE — 1159F MED LIST DOCD IN RCRD: CPT | Mod: CPTII,,, | Performed by: OBSTETRICS & GYNECOLOGY

## 2024-05-23 PROCEDURE — 99213 OFFICE O/P EST LOW 20 MIN: CPT | Mod: PBBFAC | Performed by: OBSTETRICS & GYNECOLOGY

## 2024-05-23 NOTE — PATIENT INSTRUCTIONS
Discussed increasing activity.      Discussed no heavy lifting.      Discussed no sexual relations.    She will have follow-up with me in 2 months.

## 2024-05-23 NOTE — PROGRESS NOTES
Presents for  Subjective:       Patient ID: Madelaine Palacio is a 49 y.o. female.    Chief Complaint: Post-op Evaluation (Pt is 11 days post op. C/o painful urination and having some light pink spotting.)     Presents for follow-up 11 days postop.  Patient had uterine leiomyoma requiring morcellation to remove vaginally.  The uterus weighed 430 g.    Benign cervix benign endometrium benign leiomyoma physiologic cyst of right ovary.  Left ovary was left intact.      She is doing well she does have some mild dysuria symptoms.  We discussed performing mini cath today      Review of Systems      Objective:      Physical Exam  Abdominal:      Comments:  Abdomen soft nondistended incisions healing well.         Genitourinary:     Comments: Speculum exam of the vaginal vault revealed vaginal cuff well approximated.  Bimanual exam was unremarkable.  Following examination urethra was prepped Betadine mini cath specimen was obtained slightly cloudy will follow-up culture.    Urine positive for protein but negative for leukocytes and nitrites.    We will follow-up culture and treat if positive growth occurs.        Assessment:       1. Postop check    2. Pain with urination        Plan:       Patient Instructions     Discussed increasing activity.      Discussed no heavy lifting.      Discussed no sexual relations.    She will have follow-up with me in 2 months.

## 2024-05-25 LAB — UA COMPLETE W REFLEX CULTURE PNL UR: NO GROWTH

## 2024-06-12 ENCOUNTER — TELEPHONE (OUTPATIENT)
Dept: OBSTETRICS AND GYNECOLOGY | Facility: CLINIC | Age: 49
End: 2024-06-12
Payer: MEDICAID

## 2024-06-12 NOTE — TELEPHONE ENCOUNTER
----- Message from Marylin Julian sent at 2024  9:49 AM CDT -----  #PATIENT WOULD LIKE FOR YOU TO CALL HER, SHE HAS SOME QUESTION THAT SHE NEED TO ASK YOU  CALL BACK [phone]        Verified ; patient wanted to know if the can take tub bath and exercise. Per Dr. Almanzar she can take tub bath, walk, light exercise, no heavy lifting or straining. Patient voiced understanding.

## 2024-06-14 ENCOUNTER — PATIENT MESSAGE (OUTPATIENT)
Dept: FAMILY MEDICINE | Facility: CLINIC | Age: 49
End: 2024-06-14
Payer: MEDICAID

## 2024-07-23 ENCOUNTER — OFFICE VISIT (OUTPATIENT)
Dept: OBSTETRICS AND GYNECOLOGY | Facility: CLINIC | Age: 49
End: 2024-07-23
Payer: MEDICAID

## 2024-07-23 VITALS — DIASTOLIC BLOOD PRESSURE: 74 MMHG | SYSTOLIC BLOOD PRESSURE: 128 MMHG

## 2024-07-23 DIAGNOSIS — Z72.51 UNPROTECTED SEXUAL INTERCOURSE: Primary | ICD-10-CM

## 2024-07-23 LAB
CANDIDA SPECIES: NEGATIVE
GARDNERELLA: POSITIVE
TRICHOMONAS: NEGATIVE

## 2024-07-23 PROCEDURE — 99213 OFFICE O/P EST LOW 20 MIN: CPT | Mod: PBBFAC | Performed by: OBSTETRICS & GYNECOLOGY

## 2024-07-23 PROCEDURE — 87660 TRICHOMONAS VAGIN DIR PROBE: CPT | Mod: ,,, | Performed by: CLINICAL MEDICAL LABORATORY

## 2024-07-23 PROCEDURE — 3074F SYST BP LT 130 MM HG: CPT | Mod: CPTII,,, | Performed by: OBSTETRICS & GYNECOLOGY

## 2024-07-23 PROCEDURE — 87510 GARDNER VAG DNA DIR PROBE: CPT | Mod: ,,, | Performed by: CLINICAL MEDICAL LABORATORY

## 2024-07-23 PROCEDURE — 99999 PR PBB SHADOW E&M-EST. PATIENT-LVL III: CPT | Mod: PBBFAC,,, | Performed by: OBSTETRICS & GYNECOLOGY

## 2024-07-23 PROCEDURE — 87480 CANDIDA DNA DIR PROBE: CPT | Mod: ,,, | Performed by: CLINICAL MEDICAL LABORATORY

## 2024-07-23 PROCEDURE — 3078F DIAST BP <80 MM HG: CPT | Mod: CPTII,,, | Performed by: OBSTETRICS & GYNECOLOGY

## 2024-07-23 PROCEDURE — 87591 N.GONORRHOEAE DNA AMP PROB: CPT | Mod: ,,, | Performed by: CLINICAL MEDICAL LABORATORY

## 2024-07-23 PROCEDURE — 99212 OFFICE O/P EST SF 10 MIN: CPT | Mod: S$PBB,24,, | Performed by: OBSTETRICS & GYNECOLOGY

## 2024-07-23 PROCEDURE — 87491 CHLMYD TRACH DNA AMP PROBE: CPT | Mod: ,,, | Performed by: CLINICAL MEDICAL LABORATORY

## 2024-07-23 NOTE — PATIENT INSTRUCTIONS
Recommend extra lubrication during sexual relations.      We will follow-up bacterial vaginosis probe and GC and chlamydia probe and treat if positive growth.      She will continue yearly screening mammography.    She has colonoscopy screening scheduled Monday.

## 2024-07-23 NOTE — PROGRESS NOTES
Subjective:       Patient ID: Madelaine Palacio is a 49 y.o. female.    Chief Complaint: Post-op Evaluation (Here 10 week post op check after RAH/RSO/LEFT TUBE-WANTS TO BE CHECKED FOR BV , STD. )    Presents 10 weeks post robotic hysterectomy with right salpingo-oophorectomy.  Left salpingectomy left tube left intact.      She has no complaints however she desires checked for sexually transmitted diseases.      She desires only vaginal cultures.      We discussed syphilis and HIV testing by blood which she politely declines.      Review of Systems      Objective:      Physical Exam  Genitourinary:     Comments: External normal vault normal cuff well-healed some dryness of the vaginal vault noted.  Bimanual exam unremarkable.    GC and chlamydia culture taken bacterial vaginosis probe taken.            Assessment:       1. Unprotected sexual intercourse        Plan:       Patient Instructions   Recommend extra lubrication during sexual relations.      We will follow-up bacterial vaginosis probe and GC and chlamydia probe and treat if positive growth.      She will continue yearly screening mammography.    She has colonoscopy screening scheduled Monday.

## 2024-07-24 LAB
CHLAMYDIA BY PCR: NEGATIVE
N. GONORRHOEAE (GC) BY PCR: NEGATIVE

## 2024-07-25 ENCOUNTER — PATIENT MESSAGE (OUTPATIENT)
Dept: OBSTETRICS AND GYNECOLOGY | Facility: CLINIC | Age: 49
End: 2024-07-25
Payer: MEDICAID

## 2024-07-26 ENCOUNTER — PATIENT MESSAGE (OUTPATIENT)
Dept: GASTROENTEROLOGY | Facility: CLINIC | Age: 49
End: 2024-07-26
Payer: MEDICAID

## 2024-08-02 DIAGNOSIS — N76.0 ACUTE VAGINITIS: Primary | ICD-10-CM

## 2024-08-02 RX ORDER — METRONIDAZOLE 7.5 MG/G
GEL VAGINAL
Qty: 70 G | Refills: 0 | Status: SHIPPED | OUTPATIENT
Start: 2024-08-02

## 2024-09-11 ENCOUNTER — HOSPITAL ENCOUNTER (OUTPATIENT)
Dept: RADIOLOGY | Facility: HOSPITAL | Age: 49
Discharge: HOME | End: 2024-09-11
Payer: MEDICAID

## 2024-09-11 VITALS — WEIGHT: 187 LBS | BODY MASS INDEX: 31.16 KG/M2 | HEIGHT: 65 IN

## 2024-09-11 DIAGNOSIS — Z12.39 ENCOUNTER FOR SCREENING FOR MALIGNANT NEOPLASM OF BREAST, UNSPECIFIED SCREENING MODALITY: ICD-10-CM

## 2024-09-11 PROCEDURE — 77067 SCR MAMMO BI INCL CAD: CPT | Mod: TC

## 2024-09-11 PROCEDURE — 77063 BREAST TOMOSYNTHESIS BI: CPT | Mod: TC

## 2024-12-17 DIAGNOSIS — I10 HYPERTENSION, UNSPECIFIED TYPE: ICD-10-CM

## 2025-01-27 RX ORDER — AMLODIPINE BESYLATE 5 MG/1
10 TABLET ORAL DAILY
Qty: 180 TABLET | Refills: 3 | Status: SHIPPED | OUTPATIENT
Start: 2025-01-27 | End: 2026-01-22

## 2025-03-21 ENCOUNTER — RESULTS FOLLOW-UP (OUTPATIENT)
Dept: FAMILY MEDICINE | Facility: CLINIC | Age: 50
End: 2025-03-21

## 2025-03-21 ENCOUNTER — OFFICE VISIT (OUTPATIENT)
Dept: FAMILY MEDICINE | Facility: CLINIC | Age: 50
End: 2025-03-21
Payer: MEDICAID

## 2025-03-21 VITALS
HEIGHT: 65 IN | HEART RATE: 60 BPM | SYSTOLIC BLOOD PRESSURE: 142 MMHG | OXYGEN SATURATION: 100 % | RESPIRATION RATE: 18 BRPM | TEMPERATURE: 98 F | BODY MASS INDEX: 31.16 KG/M2 | DIASTOLIC BLOOD PRESSURE: 90 MMHG | WEIGHT: 187 LBS

## 2025-03-21 DIAGNOSIS — Z12.11 COLON CANCER SCREENING: ICD-10-CM

## 2025-03-21 DIAGNOSIS — Z13.1 DIABETES MELLITUS SCREENING: Primary | ICD-10-CM

## 2025-03-21 DIAGNOSIS — J45.909 ASTHMA, UNSPECIFIED ASTHMA SEVERITY, UNSPECIFIED WHETHER COMPLICATED, UNSPECIFIED WHETHER PERSISTENT: ICD-10-CM

## 2025-03-21 DIAGNOSIS — R07.9 INTERMITTENT CHEST PAIN: ICD-10-CM

## 2025-03-21 DIAGNOSIS — I10 HYPERTENSION, UNSPECIFIED TYPE: ICD-10-CM

## 2025-03-21 LAB
CHOLEST SERPL-MCNC: 177 MG/DL
CHOLEST/HDLC SERPL: 2 {RATIO}
EST. AVERAGE GLUCOSE BLD GHB EST-MCNC: 97 MG/DL
HBA1C MFR BLD HPLC: 5 %
HDLC SERPL-MCNC: 90 MG/DL (ref 35–60)
LDLC SERPL CALC-MCNC: 76 MG/DL
NONHDLC SERPL-MCNC: 87 MG/DL
TRIGL SERPL-MCNC: 57 MG/DL (ref 37–140)
VLDLC SERPL-MCNC: 11 MG/DL

## 2025-03-21 PROCEDURE — 80061 LIPID PANEL: CPT | Mod: ,,, | Performed by: CLINICAL MEDICAL LABORATORY

## 2025-03-21 PROCEDURE — 83036 HEMOGLOBIN GLYCOSYLATED A1C: CPT | Mod: ,,, | Performed by: CLINICAL MEDICAL LABORATORY

## 2025-03-21 RX ORDER — AMLODIPINE BESYLATE 5 MG/1
10 TABLET ORAL DAILY
Qty: 180 TABLET | Refills: 3 | Status: SHIPPED | OUTPATIENT
Start: 2025-03-21 | End: 2026-03-16

## 2025-03-21 RX ORDER — ALBUTEROL SULFATE 90 UG/1
2 INHALANT RESPIRATORY (INHALATION) EVERY 6 HOURS PRN
Qty: 18 G | Refills: 2 | Status: SHIPPED | OUTPATIENT
Start: 2025-03-21

## 2025-03-21 NOTE — PATIENT INSTRUCTIONS
"Controlling Your Blood Pressure Through Lifestyle   The Basics   Written by the doctors and editors at [facility]   What does my lifestyle have to do with my blood pressure? -- The things you do and the foods you eat have a big effect on your blood pressure and your overall health. Following the right lifestyle can:  Lower your blood pressure or keep you from getting high blood pressure in the first place  Reduce your need for blood pressure medicines  Make medicines for high blood pressure work better, if you do take them  Lower the chances that you'll have a heart attack or stroke, or develop kidney disease  Which lifestyle choices will help lower my blood pressure? -- Here's what you can do:  Lose weight (if you are overweight)  Choose a diet rich in fruits, vegetables, and low-fat dairy products, and low in meats, sweets, and refined grains  Eat less salt (sodium)  Do something active for at least 30 minutes a day on most days of the week  Limit the amount of alcohol you drink  If you have high blood pressure, it's also very important to quit smoking (if you smoke). Quitting smoking might not bring your blood pressure down. But it will lower the chances that you'll have a heart attack or stroke, and it will help you feel better and live longer.  Start low and go slow -- The changes listed above might sound like a lot, but don't worry. You don't have to change everything all at once. The key to improving your lifestyle is to "start low and go slow." Choose 1 small, specific thing to change and try doing it for a while. If it works for you, keep doing it until it becomes a habit. If it doesn't, don't give up. Choose something else to change and see how that goes.  Let's say, for example, that you would like to improve your diet. If you're the type of person who eats cheeseburgers and French fries all the time, you can't switch to eating just salads from one day to the next. When people try to make changes like that, " "they often fail. Then they feel frustrated and tend to give up. So instead of trying to change everything about your diet in 1 day, change 1 or 2 small things about your diet and give yourself time to get used to those changes. For instance, keep the cheeseburger but give up the French fries. Or eat the same things but cut your portions in half.  As you find things that you are able to change and stick with, keep adding new changes. In time, you will see that you can actually change a lot. You just have to get used to the changes slowly.  Lose weight -- When people think about losing weight, they sometimes make it more complicated than it really is. To lose weight, you have to either eat less or move more. If you do both of those things, it's even better. But there is no single weight-loss diet or activity that's better than any other. When it comes to weight loss, the most effective plan is the one that you'll stick with.  Improve your diet -- There is no single diet that is right for everyone. But in general, a healthy diet can include:  Lots of fruits, vegetables, and whole grains  Some beans, peas, lentils, chickpeas, and similar foods  Some nuts, such as walnuts, almonds, and peanuts  Fat-free or low-fat milk and milk products  Some fish  To have a healthy diet, it's also important to limit or avoid sugar, sweets, meats, and refined grains. (Refined grains are found in white bread, white rice, most forms of pasta, and most packaged "snack" foods.)  Reduce salt -- Many people think that eating a low-sodium diet means avoiding the salt shaker and not adding salt when cooking. The truth is, not adding salt at the table or when you cook will only help a little. Almost all of the sodium you eat is already in the food you buy at the grocery store or at restaurants (figure 1).  The most important thing you can do to cut down on sodium is to eat less processed food. That means that you should avoid most foods that are " "sold in cans, boxes, jars, and bags. You should also eat in restaurants less often.  To reduce the amount of sodium you get, buy fresh or fresh-frozen fruits, vegetables, and meats. (Fresh-frozen foods have had nothing added to them before freezing.) Then you can make meals at home, from scratch, with these ingredients.  As with the other changes, don't try to cut out salt all at once. Instead, choose 1 or 2 foods that have a lot of sodium and try to replace them with low-sodium choices. When you get used to those low-sodium options, find another food or 2 to change. Then keep going, until all the foods you eat are sodium-free or low in sodium.  Become more active -- If you want to be more active, you don't have to go to the gym or get all sweaty. It is possible to increase your activity level while doing everyday things you enjoy. Walking, gardening, and dancing are just a few of the things that you might try. As with all the other changes, the key is not to do too much too fast. If you don't do any activity now, start by walking for just a few minutes every other day. Do that for a few weeks. If you stick with it, try doing it for longer. But if you find that you don't like walking, try a different activity.  Drink less alcohol -- If you are a woman, do not have more than 1 "standard drink" of alcohol a day. If you are a man, do not have more than 2. A "standard drink" is:  A can or bottle that has 12 ounces of beer  A glass that has 5 ounces of wine  A shot that has 1.5 ounces of whiskey  Where should I start? -- If you want to improve your lifestyle, start by making the changes that you think would be easiest for you. If you used to exercise and just got out of the habit, maybe it would be easy for you to start exercising again. Or if you actually like cooking meals from scratch, maybe the first thing you should focus on is eating home-cooked meals that are low in sodium.  Whatever you tackle first, choose " "specific, realistic goals, and give yourself a deadline. For example, do not decide that you are going to "exercise more." Instead, decide that you are going to walk for 10 minutes on Monday, Wednesday, and Friday, and that you are going to do this for the next 2 weeks.  When lifestyle changes are too general, people have a hard time following through.  Now go. You can do it!  All topics are updated as new evidence becomes available and our peer review process is complete.  This topic retrieved from [employer] on: Sep 21, 2021.  Topic 74364 Version 8.0  Release: 29.4.2 - C29.263  © 2021 UpToDate, Inc. and/or its affiliates. All rights reserved.  figure 1: Sources of sodium in your diet     Graphic 66142 Version 2.0    Consumer Information Use and Disclaimer   This information is not specific medical advice and does not replace information you receive from your health care provider. This is only a brief summary of general information. It does NOT include all information about conditions, illnesses, injuries, tests, procedures, treatments, therapies, discharge instructions or life-style choices that may apply to you. You must talk with your health care provider for complete information about your health and treatment options. This information should not be used to decide whether or not to accept your health care provider's advice, instructions or recommendations. Only your health care provider has the knowledge and training to provide advice that is right for you. The use of this information is governed by the [employer] End User License Agreement, available at https://www.eTipping.Cook123/en/solutions/YouFastUnlock/about/felicity.The use of [employer] content is governed by the [employer] Terms of Use. ©2021 UpToDate, Inc. All rights reserved.  Copyright   © 2021 UpToDate, Inc. and/or its affiliates. All rights reserved.      DASH Diet   About this topic   DASH stands for Dietary Approaches to Stop Hypertension. The DASH diet may " help you lower blood pressure. It may also help keep you from getting high blood pressure. You will eat less fat and more fiber on the DASH diet.  This diet gives you more minerals that fight high blood pressure. Some nutrients in this diet are:  Potassium ? Acts to help you get rid of salt. This may help to lower blood pressure.  Calcium ? Makes blood vessels and muscles work the right way  Magnesium - Helps blood vessels relax  Fiber ? Helps you feel full. It also helps digestion.  What will the results be?   The DASH diet may help you:  Lower your blood pressure and cholesterol  Lower your risk for cancer, heart disease, heart attack, and stroke. It may also lower your risk for heart failure, kidney stones, and diabetes.  Lose weight or keep a healthy weight  What lifestyle changes are needed?   Add regular exercise to get the most help from this diet.  Try to lower stress. Find ways to relax.  Stop smoking. Avoid secondhand smoke.  Limit alcohol intake.  What changes to diet are needed?   Know about poor eating habits. Then, you can fix them as you work with the program.  This diet encourages fruits and vegetables, whole grains, lean meats, healthy fats, and low-fat or fat-free dairy products.  This diet is lower in saturated fats, trans-fats, cholesterol, added sugars, and sodium.  Who should use this diet?   This eating plan is good for the whole family. It is also good for people with high blood pressure and those at risk for high blood pressure.  What foods are good to eat?   Grains: Try to eat 6 to 8 servings of whole grain, high fiber foods each day. These are bread, cereals, brown rice, or pasta.  Fruits and vegetables: Eat 4 to 5 servings each day. Try to pick many kinds and colors. Fresh or frozen are best. Look for low sodium or salt-free if you choose canned.  Dairy: Try to eat 2 to 3 servings of fat free and low fat milk products each day.  Lean meats, poultry, and seafood: Try to eat 6 servings or  less of lean meats, poultry, and seafood each day. Try to choose more low fat or lean meats like chicken and turkey. Eat less red meat. Eat more fish instead.  Nuts, seeds, and legumes (dry beans and peas): Try to eat 4 to 5 servings each week. Try to pick nuts such as almonds and walnuts, sunflower seeds, peanut butter, soy beans, lentils, kidney beans, and split peas.  Fats and oils: Try to eat 2 to 3 servings of fats and oils each day. Eat good fats found in fish, nuts, and avocados. Try using olive oil or vegetable oils such as canola oil. Other good oils to try are corn, safflower, sunflower, or soybean oils. Use low-sodium and low-fat salad dressing and mayonnaise.  Condiments: Pepper, herbs, spices, vinegar, lemon or lime juices are great for seasoning. Be careful to choose low-sodium or salt-free products if you use broths, soups, or soy sauce.  Sweets: Try to eat less than 5 servings each week. Choose low-fat and trans fat-free desserts. These are things like fruit flavored gelatin, sorbet, jelly beans, jean crackers, animal crackers, low-fat fig bars, and matt snaps. Eat fruit to satisfy your desire for sweets.     What foods should be limited or avoided?   Grains: Salted breads, rolls, crackers, quick breads, self-rising flours, biscuit mixes, regular bread crumbs, instant hot cereals, commercially-prepared rice, pasta, stuffing mixes  Fruits and vegetables: Commercially-prepared potatoes and vegetable mixes, regular canned vegetables and juices, vegetables frozen with sauce or pickled vegetables, processed fruits with salt or sodium  Milk: Whole milk, malted milk, chocolate milk, buttermilk, cheese, ice cream  Meats and beans: Smoked, cured, salted, or canned fish; meats or poultry such as rust, sausages, sardines; high-fat cuts of meat like beef, lamb, or pork; chicken with the skin on it  Fats: Cut back on solid fats like butter, lard, and margarine. Eat less food with high saturated fat,  cholesterol and total fat.  Condiments and snacks: Salted and canned peas, beans, and olives; salted snack foods; fried foods; soda or other sweetened drinks  Sweets: High-fat baked goods such as muffins, donuts, pastries, commercial baked goods, candy bars  If you choose to drink alcohol, limit the amount you drink. Women should have 1 drink or less per day and men should have 2 drinks or less per day.  Helpful tips   Avoid eating canned vegetables and processed foods. These have a lot of salt in them. Look for a low-salt or low-sodium choice.  Try baking or broiling instead of frying food.  Write down the foods you eat. This will help you track what you have eaten each week.  When you go to a grocery store, have a list or a meal plan. Do not shop when you are hungry to avoid cravings for foods.  Read food labels with care. They will show you how much is in a serving. The amount is given as a percentage of the total amount you need each day. Reading labels will help you make healthy food choices.       Avoid fast foods.  Talk to your doctor or dietitian to see if you need vitamin and mineral supplements to help you balance your diet.  Talk to a dietitian for help.  Where can I learn more?   Academy of Nutrition and Dietetics  https://www.eatright.org/health/wellness/heart-and-cardiovascular-health/dash-diet-reducing-hypertension-through-diet-and-lifestyle   FamilyDoctor.org  http://familydoctor.org/familydoctor/en/prevention-wellness/food-nutrition/weight-loss/the-dash-diet-healthy-eating-to-control-your-blood-pressure.html   Last Reviewed Date   2021-03-15  Consumer Information Use and Disclaimer   This information is not specific medical advice and does not replace information you receive from your health care provider. This is only a brief summary of general information. It does NOT include all information about conditions, illnesses, injuries, tests, procedures, treatments, therapies, discharge instructions or  life-style choices that may apply to you. You must talk with your health care provider for complete information about your health and treatment options. This information should not be used to decide whether or not to accept your health care providers advice, instructions or recommendations. Only your health care provider has the knowledge and training to provide advice that is right for you.  Copyright   Copyright © 2021 UpToDate, Inc. and its affiliates and/or licensors. All rights reserved.    Why Water Is Important to Health   About this topic   Your body needs a certain amount of water to work the right way. Your body takes in water from the fluids you drink and the food you eat. This helps your body get rid of waste products. Water also helps keep your temperature normal, helps with digestion, protects your spinal cord, and lubricates your joints.  If you dont have enough water, doctors say you are dehydrated. You must take in enough water each day to replace what your body loses when you:  Sweat  Pass urine  Have a bowel movement  Breathe  You may lose even more water than normal if you are sick or hurt with something like:  A fever  A burn  An illness where you throw up or have loose stools  Some medicines can also make you lose more water than normal.  General   Most people have heard they need 6 to 8 glasses of water each day. This suggestion is not backed by science. Different people need to drink different amounts of water. How much you need to drink is based on things like your age, body, health, weather, and how active you are. It is even possible to drink too much water in some cases. Being thirsty is your bodys way of telling you it needs fluids.  Good ways to make sure you take in enough fluids include:  Drink healthy fluids when you are thirsty. These are things like water, low-fat milk, plain or flavored seltzer, or unsweetened tea or coffee.  Eat foods that have a higher water content. Fruits  and vegetables like strawberries, watermelon, tomatoes, and celery all have high water content.  Drink water before, during, and after a workout. Only drink sports drinks if you plan to exercise at an intense rate for more than an hour. Sports drinks have carbohydrates and electrolytes that can help with recovery.  Drink water with meals.  There are some kinds of drinks that are not healthy and should be limited or avoided. Try NOT to drink:  Sugary drinks like soda, sports drinks, or sweetened tea or coffee. These can add calories to your diet and lead to tooth decay.  Energy drinks. These can have a lot of caffeine and sugar in them.  Juice drinks. These often have limited amounts of juice and a lot of sugar in them.  Too much alcohol or caffeine. Both of these can cause dehydration.  Tips to increase your fluid intake:  Keep a bottle of water with you. This can encourage you to drink more.  Add fruits or vegetables to plain water to change the taste. Add berries, carlos, or cucumbers to your water to flavor it.  When you are hungry, you might actually be thirsty. True hunger will not go away by drinking water. Water can help you manage your weight.  If you have trouble remembering to drink water, try drinking water on a schedule. Set specific times for when you will drink water.  Choose water when eating at restaurants.  Where can I learn more?   American Association of Family Physicians  https://familydoctor.org/hydration-why-its-so-important/   Better Health Channel  https://www.betterhealth.radha.gov.au/health/HealthyLiving/water-a-vital-nutrient   Centers for Disease Control and Prevention  https://www.cdc.gov/healthywater/drinking/nutrition/index.html   Kids Health  https://kidshealth.org/en/kids/water.html   Last Reviewed Date   2021-09-09  Consumer Information Use and Disclaimer   This information is not specific medical advice and does not replace information you receive from your health care provider. This  is only a brief summary of general information. It does NOT include all information about conditions, illnesses, injuries, tests, procedures, treatments, therapies, discharge instructions or life-style choices that may apply to you. You must talk with your health care provider for complete information about your health and treatment options. This information should not be used to decide whether or not to accept your health care providers advice, instructions or recommendations. Only your health care provider has the knowledge and training to provide advice that is right for you.  Copyright   Copyright © 2021 UpToDate, Inc. and its affiliates and/or licensors. All rights reserved.

## 2025-03-21 NOTE — PROGRESS NOTES
Subjective     Patient ID: Madelaine Palacio is a 50 y.o. female.    Chief Complaint: Medication Refill and Health Maintenance (TETANUS VACCINE Never done- pt declines/Pneumococcal Vaccines (Age 0-49)(1 of 2 - PCV) Never done-pt declines/Colorectal Cancer Screening Never done- pt declines/COVID-19 Vaccine(3 - 2024-25 season) due on 09/01/2024-pt declines/Hemoglobin A1c (Diabetic Prevention Screening) due on 12/16/2024 - ordered today)    Presents to clinic for med refill. Reports intermittent episodes of chest pain     Medication Refill  Associated symptoms include chest pain (intermittent, not currently present). Pertinent negatives include no abdominal pain, fatigue, headaches, numbness, vertigo or weakness.     Review of Systems   Constitutional:  Negative for activity change, fatigue and unexpected weight change.   Eyes:  Negative for visual disturbance.   Respiratory:  Negative for chest tightness and shortness of breath.    Cardiovascular:  Positive for chest pain (intermittent, not currently present). Negative for palpitations and leg swelling.   Gastrointestinal:  Negative for abdominal pain and blood in stool.   Genitourinary:  Negative for decreased urine volume, difficulty urinating, dysuria, flank pain, frequency and hematuria.   Neurological:  Negative for dizziness, vertigo, tremors, seizures, syncope, facial asymmetry, speech difficulty, weakness, light-headedness, numbness, headaches and memory loss.   Hematological:  Negative for adenopathy. Does not bruise/bleed easily.   Psychiatric/Behavioral:  The patient is not nervous/anxious.           Objective     Physical Exam  Vitals and nursing note reviewed.   Constitutional:       Appearance: Normal appearance. She is normal weight.   Cardiovascular:      Rate and Rhythm: Normal rate and regular rhythm.      Pulses: Normal pulses.      Heart sounds: Normal heart sounds.   Pulmonary:      Effort: Pulmonary effort is normal.      Breath sounds: Normal breath  sounds.   Abdominal:      General: Abdomen is flat. Bowel sounds are normal.      Palpations: Abdomen is soft.   Musculoskeletal:         General: Normal range of motion.   Skin:     General: Skin is warm and dry.      Capillary Refill: Capillary refill takes less than 2 seconds.   Neurological:      General: No focal deficit present.      Mental Status: She is alert and oriented to person, place, and time.   Psychiatric:         Mood and Affect: Mood normal.         Behavior: Behavior normal.         Thought Content: Thought content normal.         Judgment: Judgment normal.            Assessment and Plan     1. Diabetes mellitus screening  -     Hemoglobin A1C; Future; Expected date: 03/21/2025    2. Hypertension, unspecified type  Overview:  BP running low    Orders:  -     amLODIPine (NORVASC) 5 MG tablet; Take 2 tablets (10 mg total) by mouth once daily.  Dispense: 180 tablet; Refill: 3  -     Lipid Panel; Future; Expected date: 03/21/2025    3. Asthma, unspecified asthma severity, unspecified whether complicated, unspecified whether persistent  -     albuterol (PROVENTIL/VENTOLIN HFA) 90 mcg/actuation inhaler; Inhale 2 puffs into the lungs every 6 (six) hours as needed for Wheezing. Rescue  Dispense: 18 g; Refill: 2    4. Colon cancer screening  -     Cologuard Screening (Multitarget Stool DNA); Future; Expected date: 03/21/2025    5. Intermittent chest pain  -     Ambulatory referral/consult to Cardiology; Future; Expected date: 03/28/2025        Contact with labs  Educational materials provided  Notify clinic if you have not heard anything regarding referral(s) within the next two weeks           Follow up in about 3 months (around 6/21/2025).    I spent a total of 15 minutes on the day of the visit.This includes face to face time and non-face to face time preparing to see the patient (eg, review of tests), obtaining and/or reviewing separately obtained history, documenting clinical information in the  electronic or other health record, independently interpreting results and communicating results to the patient/family/caregiver, or care coordinator.    GONSALO Ramos

## 2025-03-25 ENCOUNTER — OFFICE VISIT (OUTPATIENT)
Dept: CARDIOLOGY | Facility: CLINIC | Age: 50
End: 2025-03-25
Payer: MEDICAID

## 2025-03-25 VITALS
SYSTOLIC BLOOD PRESSURE: 128 MMHG | OXYGEN SATURATION: 98 % | DIASTOLIC BLOOD PRESSURE: 78 MMHG | HEIGHT: 65 IN | BODY MASS INDEX: 32.29 KG/M2 | WEIGHT: 193.81 LBS | HEART RATE: 65 BPM

## 2025-03-25 DIAGNOSIS — R07.9 CHEST PAIN IN ADULT: ICD-10-CM

## 2025-03-25 DIAGNOSIS — R07.9 INTERMITTENT CHEST PAIN: ICD-10-CM

## 2025-03-25 DIAGNOSIS — I20.9 ANGINA, CLASS III: Primary | ICD-10-CM

## 2025-03-25 DIAGNOSIS — I10 PRIMARY HYPERTENSION: ICD-10-CM

## 2025-03-25 PROCEDURE — 99999 PR PBB SHADOW E&M-EST. PATIENT-LVL IV: CPT | Mod: PBBFAC,,, | Performed by: HOSPITALIST

## 2025-03-25 PROCEDURE — 99204 OFFICE O/P NEW MOD 45 MIN: CPT | Mod: S$PBB,,, | Performed by: HOSPITALIST

## 2025-03-25 PROCEDURE — 1159F MED LIST DOCD IN RCRD: CPT | Mod: CPTII,,, | Performed by: HOSPITALIST

## 2025-03-25 PROCEDURE — 3008F BODY MASS INDEX DOCD: CPT | Mod: CPTII,,, | Performed by: HOSPITALIST

## 2025-03-25 PROCEDURE — 3044F HG A1C LEVEL LT 7.0%: CPT | Mod: CPTII,,, | Performed by: HOSPITALIST

## 2025-03-25 PROCEDURE — 3074F SYST BP LT 130 MM HG: CPT | Mod: CPTII,,, | Performed by: HOSPITALIST

## 2025-03-25 PROCEDURE — 99214 OFFICE O/P EST MOD 30 MIN: CPT | Mod: PBBFAC | Performed by: HOSPITALIST

## 2025-03-25 PROCEDURE — 3078F DIAST BP <80 MM HG: CPT | Mod: CPTII,,, | Performed by: HOSPITALIST

## 2025-03-25 NOTE — PROGRESS NOTES
CARDIOVASCULAR CONSULTATION        REASON FOR CONSULT:   Madelaine Palacio is a 50 y.o. female who presents for   Chief Complaint   Patient presents with    Chest Pain     Patient states that she has a sharp pain at the center of her chest sometimes. Lasts a few seconds.     Dizziness     On exertion or positional change.     Shortness of Breath     Hx of Asthma.           HISTORY OF PRESENT ILLNESS:   50 y.o. female who  has a past medical history of Anemia, unspecified, Asthma, Hypertension, and Uterine fibroid.    Today we discussed the patient's cardiac symptoms at length. The patient described chest pain that is difficult to quantify - she thinks it is due to her blood pressure being high. She states she hasn't had it in about a week. She states it occurs sporadically/randomly, lasts seconds, and is never exertional. She endorsed bilateral carpal tunnel syndrome which can be seen in infiltrative cardiomyopathies, however she denies exertional symptoms (emphatically).     PAST MEDICAL HISTORY:     Past Medical History:   Diagnosis Date    Anemia, unspecified     Asthma     Hypertension     Uterine fibroid        PAST SURGICAL HISTORY:     Past Surgical History:   Procedure Laterality Date    COSMETIC SURGERY  Skin draft on right hand    CYSTOSCOPY N/A 05/13/2024    Procedure: CYSTOSCOPY;  Surgeon: Atul Almanzar MD;  Location: [facility];  Service: OB/GYN;  Laterality: N/A;    HYSTERECTOMY      left wrist fracture      OOPHORECTOMY      right wrist skin graft      XI ROBOTIC HYSTERECTOMY, WITH SALPINGO-OOPHORECTOMY Bilateral 05/13/2024    Procedure: XI ROBOTIC HYSTERECTOMY,WITH SALPINGO-OOPHORECTOMY;  Surgeon: Atul Almanzar MD;  Location: [facility];  Service: OB/GYN;  Laterality: Bilateral;       ALLERGIES AND MEDICATION:   Review of patient's allergies indicates:  No Known Allergies     Medication List            Accurate as of March 25, 2025  8:43 AM. If you have any questions, ask your nurse or  "doctor.                CONTINUE taking these medications      albuterol 90 mcg/actuation inhaler  Commonly known as: PROVENTIL/VENTOLIN HFA  Inhale 2 puffs into the lungs every 6 (six) hours as needed for Wheezing. Rescue     amLODIPine 5 MG tablet  Commonly known as: NORVASC  Take 2 tablets (10 mg total) by mouth once daily.     ferrous sulfate 324 mg (65 mg iron) Tbec  Take 1 tablet (324 mg total) by mouth 2 (two) times daily.     metroNIDAZOLE 0.75 % (37.5mg/5 gram) vaginal gel  Commonly known as: METROGEL  One applicator full per vagina nightly x5 nights     polyethylene glycol 236-22.74-6.74 -5.86 gram suspension  Commonly known as: GoLYTELY     traMADoL 50 mg tablet  Commonly known as: ULTRAM  Take 1 tablet (50 mg total) by mouth every 6 (six) hours.              SOCIAL HISTORY:   Social History[1]    FAMILY HISTORY:     Family History   Problem Relation Name Age of Onset    Diabetes Mother Macy Finch     Hypertension Mother Macy Finch     Hypertension Maternal Grandmother         REVIEW OF SYSTEMS:       Cardiology focused 12-point ROS otherwise unremarkable except for as mentioned in HPI and A/P.   Pertinent Positives and Negatives documented herein.       PHYSICAL EXAM:     Vitals:    03/25/25 0819   BP: 128/78   Pulse: 65    Body mass index is 32.25 kg/m².  Weight: 87.9 kg (193 lb 12.8 oz)   Height: 5' 5" (165.1 cm)     Gen: NAD  HEENT: NC/AT, MMM  Chest: normal wob  CV: RRR, no m/g/r  Ext: mild/trace edema of BLEs  Skin: no rash  Psych: [facility]AA  Neuro: [facility]NGI      DATA:     Laboratory:  CBC:  Recent Labs   Lab 03/05/24  0825 05/07/24  0917 05/13/24  1947 05/14/24  0319 05/14/24  1048   WBC 7.08 5.95  --  17.03 H  --    Hemoglobin 7.6 L 7.8 L 8.2 L 7.1 L 7.8 L   Hematocrit 27.0 L 28.4 L 30.0 L 25.5 L 28.1 L   Platelet Count 569 H 509 H  --  454 H  --        CHEMISTRIES:  Recent Labs   Lab 01/24/23  1517 03/05/24  0825 05/07/24  0917   Glucose 77 91 92   Sodium 140 140 144   Potassium 3.3 L 3.5 4.0   BUN 9 7 " "10   Creatinine 0.67 0.59 0.72   Calcium 9.1 8.8 9.0       CARDIAC BIOMARKERS:      No results found for: "BNP"    COAGS:        LIPIDS/LFTS:  Recent Labs   Lab 01/24/23  1517 03/05/24  0825 03/08/24  0931 05/07/24  0917 03/21/25  1131   Cholesterol 154  --  169  --  177   Triglycerides 76  --  57  --  57   HDL Cholesterol 84 H  --  92 H  --  90 H   LDL Calculated 55  --  66  --  76   Non-HDL 70  --  77  --  87   AST 10 L 12 L  --  16  --    ALT 20 21  --  29  --        Hemoglobin A1C   Date Value Ref Range Status   03/21/2025 5.0 <=7.0 % Final     Comment:       Normal:               <5.7%  Pre-Diabetic:       5.7% to 6.4%  Diabetic:             >6.4%  Diabetic Goal:     <7%   12/16/2021 5.3 4.5 - 6.6 % Final     Comment:       Normal:               <5.7%  Pre-Diabetic:       5.7% to 6.4%  Diabetic:             >6.4%  Diabetic Goal:     <7%       TSH        The 10-year ASCVD risk score (Linsey GENAO, et al., 2019) is: 1.4%    Values used to calculate the score:      Age: 50 years      Sex: Female      Is Non- : Yes      Diabetic: No      Tobacco smoker: No      Systolic Blood Pressure: 128 mmHg      Is BP treated: Yes      HDL Cholesterol: 90 mg/dL      Total Cholesterol: 177 mg/dL     IMAGING  No orders to display       ASSESSMENT AND PLAN     Problem List[2]      Orders Placed This Encounter   Procedures    EKG 12-lead       1. Intermittent chest pain  - Ambulatory referral/consult to Cardiology    2. Angina, class III  - EKG 12-lead; Future  - EKG 12-lead    Problem Noted   Intermittent Chest Pain 3/25/2025    Patient described noncardiac chest pain that is not exertional, occurs randomly, is substernal, last seconds, is self limiting  -is noncardiac chest pain by definition - when discussing e.g. stress modalities, the patient said 'i run up my steps all day every day.'   -discussed at length with patient - is noncardiac by definition   -offered empiric trial of PPI as GERD is most common " "cause of noncardiac chest pain, but patient would prefer to simply fill her antihypertensives today       Angina, Class III 3/25/2025   Hypertension 8/8/2023    /78 (BP Location: Left arm, Patient Position: Sitting)   Pulse 65   Ht 5' 5" (1.651 m)   Wt 87.9 kg (193 lb 12.8 oz)   LMP 04/26/2024 (Approximate)   SpO2 98%   BMI 32.25 kg/m²     Slightly above goal today. Patient hasn't taken amlodipine in 4 days.  Recommended she fill her prescription and begin taking her ambulatory blood pressure medication.                    This note was dictated with the help of speech recognition software.  There might be un-intended errors and/or substitutions.                       [1]   Social History  Socioeconomic History    Marital status: Single   Tobacco Use    Smoking status: Former     Types: Cigarettes     Passive exposure: Never    Smokeless tobacco: Never   Substance and Sexual Activity    Alcohol use: Yes     Comment: monthly     Drug use: Not Currently    Sexual activity: Yes     Partners: Male     Birth control/protection: Condom   Social History Narrative    Lives at home with boyfriend and great niece (primary guardian)    No pets in home. No smoking inside   [2]   Patient Active Problem List  Diagnosis    Uterine leiomyoma    History of anemia    Hypertension    Marijuana user    Chronic left shoulder pain    Mild intermittent asthma without complication    Pelvic pain    Hordeolum externum of left lower eyelid     " unknown

## 2025-05-06 ENCOUNTER — OFFICE VISIT (OUTPATIENT)
Dept: FAMILY MEDICINE | Facility: CLINIC | Age: 50
End: 2025-05-06
Payer: MEDICAID

## 2025-05-06 DIAGNOSIS — A60.00 GENITAL HERPES SIMPLEX, UNSPECIFIED SITE: Primary | ICD-10-CM

## 2025-05-06 PROCEDURE — 3044F HG A1C LEVEL LT 7.0%: CPT | Mod: GT,CPTII,,

## 2025-05-06 PROCEDURE — 1159F MED LIST DOCD IN RCRD: CPT | Mod: GT,CPTII,,

## 2025-05-06 PROCEDURE — 1160F RVW MEDS BY RX/DR IN RCRD: CPT | Mod: GT,CPTII,,

## 2025-05-06 PROCEDURE — 98004 SYNCH AUDIO-VIDEO EST SF 10: CPT | Mod: GT,,,

## 2025-05-06 RX ORDER — VALACYCLOVIR HYDROCHLORIDE 500 MG/1
500 TABLET, FILM COATED ORAL 2 TIMES DAILY
Qty: 60 TABLET | Refills: 1 | Status: SHIPPED | OUTPATIENT
Start: 2025-05-06

## 2025-05-06 NOTE — PROGRESS NOTES
Subjective     Patient ID: Madelaine Palacio is a 50 y.o. female.    Chief Complaint: No chief complaint on file.    LR is a 50 year old female that presents today for medication refill of valtrex via audio/visual telemedicine visit. She has a history of genital herpes and reports having a breakout to her buttock x2 days. She notes that she has been under stress lately. She denies any vaginal discharge.     Rash  This is a recurrent problem. The current episode started yesterday. The problem has been gradually worsening since onset. The affected locations include the right buttock. The rash is characterized by blistering, burning and itchiness. She was exposed to nothing. Pertinent negatives include no anorexia, congestion, cough, diarrhea, eye pain, facial edema, fatigue, fever, joint pain, nail changes, rhinorrhea, shortness of breath, sore throat or vomiting. Past treatments include nothing. The treatment provided no relief. Her past medical history is significant for asthma and varicella.     Review of Systems   Constitutional:  Negative for fatigue and fever.   HENT:  Negative for nasal congestion, rhinorrhea and sore throat.    Eyes:  Negative for pain.   Respiratory:  Negative for cough and shortness of breath.    Gastrointestinal:  Negative for anorexia, diarrhea and vomiting.   Musculoskeletal:  Negative for joint pain.   Integumentary:  Positive for rash. Negative for nail changes.          Objective     Physical Exam  Vitals and nursing note reviewed.   HENT:      Head: Normocephalic.      Nose: Nose normal.   Eyes:      Extraocular Movements: Extraocular movements intact.      Conjunctiva/sclera: Conjunctivae normal.      Pupils: Pupils are equal, round, and reactive to light.   Pulmonary:      Effort: Pulmonary effort is normal.   Musculoskeletal:         General: Normal range of motion.      Cervical back: Normal range of motion.   Skin:     General: Skin is warm.   Neurological:      Mental Status: She is  alert and oriented to person, place, and time.   Psychiatric:         Mood and Affect: Mood normal.         Behavior: Behavior normal.         Thought Content: Thought content normal.          Assessment and Plan     1. Genital herpes simplex, unspecified site  -     valACYclovir (VALTREX) 500 MG tablet; Take 1 tablet (500 mg total) by mouth 2 (two) times daily. With outbreaks  Dispense: 60 tablet; Refill: 1        Take medication as prescribed         Follow up if symptoms worsen or fail to improve.

## 2025-05-15 ENCOUNTER — PATIENT OUTREACH (OUTPATIENT)
Facility: HOSPITAL | Age: 50
End: 2025-05-15
Payer: MEDICAID

## 2025-05-15 NOTE — PROGRESS NOTES
Population Health Chart Review & Patient Outreach Details      Further Action Needed If Patient Returns Outreach:        Health Maintenance Due   Topic Date Due    TETANUS VACCINE  Never done    Pneumococcal Vaccines (Age 0-49) (1 of 2 - PCV) Never done    High Dose Statin  Never done    Colorectal Cancer Screening  Never done    COVID-19 Vaccine (3 - 2024- season) 2024          Updates Requested / Reviewed:     [x]  Care Everywhere    [x]     []  External Sources ([facility], [facility], [facility], etc.)    [] [facility]   [] [facility]   [] Other:    []  Care Team Updated   []  Removed  or Duplicate Orders   []  Immunization Reconciliation Completed / Queried    [] [place]   [] [place]   [] [place]   [] [place]      Health Maintenance Topics Addressed and Outreach Outcomes / Actions Taken:             Breast Cancer Screening []  Mammogram Order Placed    []  Mammogram Screening Scheduled    []  External Records Requested & Care Team Updated if Applicable    []  External Records Uploaded & Care Team Updated if Applicable    []  Pt Declined Scheduling Mammogram    []  Pt Will Schedule with External Provider / Order Routed & Care Team Updated if Applicable              Cervical Cancer Screening []  Pap Smear Scheduled in Primary Care or OBGYN    []  External Records Requested & Care Team Updated if Applicable       []  External Records Uploaded, Care Team Updated, & History Updated if Applicable    []  Patient Declined Scheduling Pap Smear    []  Patient Will Schedule with External Provider & Care Team Updated if Applicable                  Colorectal Cancer Screening []  Colonoscopy Case Request / Referral / Home Test Order Placed    []  External Records Requested & Care Team Updated if Applicable    []  External Records Uploaded, Care Team Updated, & History Updated if Applicable    []  Patient Declined Completing Colon Cancer Screening    []  Patient Will Schedule with External Provider & Care Team  Updated if Applicable    []  Fit Kit Mailed (add the SmartPhrase under additional notes)    []  Reminded Patient to Complete Home Test                Diabetic Eye Exam []  Eye Exam Screening Order Placed    []  Eye Camera Scheduled or Optometry/Ophthalmology Referral Placed    []  External Records Requested & Care Team Updated if Applicable    []  External Records Uploaded, Care Team Updated, & History Updated if Applicable    []  Patient Declined Scheduling Eye Exam    []  Patient Will Schedule with External Provider & Care Team Updated if Applicable             Blood Pressure Control []  Primary Care Follow Up Visit Scheduled     []  Remote Blood Pressure Reading Captured    []  Patient Declined Remote Reading or Scheduling Appt - Escalated to PCP    []  Patient Will Call Back or Send Portal Message with Reading                 HbA1c & Other Labs []  Overdue Lab(s) Ordered    []  Overdue Lab(s) Scheduled    []  External Records Uploaded & Care Team Updated if Applicable    []  Primary Care Follow Up Visit Scheduled     []  Reminded Patient to Complete A1c Home Test    []  Patient Declined Scheduling Labs or Will Call Back to Schedule    []  Patient Will Schedule with External Provider / Order Routed, & Care Team Updated if Applicable           Primary Care Appointment [x]  Primary Care Appt Scheduled    []  Patient Declined Scheduling or Will Call Back to Schedule    []  Pt Established with External Provider, Updated Care Team, & Informed Pt to Notify Payor if Applicable           Medication Adherence /    Statin Use [x]  Primary Care Appointment Scheduled    []  Patient Reminded to  Prescription    []  Patient Declined, Provider Notified if Needed    []  Sent Provider Message to Review to Evaluate Pt for Statin, Add Exclusion Dx Codes, Document   Exclusion in Problem List, Change Statin Intensity Level to Moderate or High Intensity if Applicable                Osteoporosis Screening []  Dexa Order Placed     []  Dexa Appointment Scheduled    []  External Records Requested & Care Team Updated    []  External Records Uploaded, Care Team Updated, & History Updated if Applicable    []  Patient Declined Scheduling Dexa or Will Call Back to Schedule    []  Patient Will Schedule with External Provider / Order Routed & Care Team Updated if Applicable       Additional Notes:

## (undated) DEVICE — GLOVE SENSICARE PI SURG 6.5

## (undated) DEVICE — PROGRASP DA VINCI

## (undated) DEVICE — GLOVE 6.5 PROTEXIS PI BLUE

## (undated) DEVICE — DRAPE ARM DAVINCI XI

## (undated) DEVICE — DRIVER NEEDLE SUTURECUT MEGA

## (undated) DEVICE — FORCEP FENESTRATED BIPOLAR

## (undated) DEVICE — SET PNEUMOCLEAR HEAT HUM SE HF

## (undated) DEVICE — GLOVE SENSICARE PI SURG 7.5

## (undated) DEVICE — COVER TIP CURVED SCISSORS XI

## (undated) DEVICE — SUT VICRYL PLUS 1 CTX 36IN

## (undated) DEVICE — TIP RUMI GREEN DISPOSABLE6.7MM

## (undated) DEVICE — GLOVE SENSICARE PI GRN 7

## (undated) DEVICE — SUT 4-0 VICRYL / FS-2

## (undated) DEVICE — GLOVE SENSICARE PI SURG 8

## (undated) DEVICE — GLOVE SENSICARE PI SURG 7

## (undated) DEVICE — KIT ROBOTIC RUSH

## (undated) DEVICE — SOL NACL IRR 1000ML BTL

## (undated) DEVICE — GLOVE SENSICARE PI GRN 6.5

## (undated) DEVICE — WARMER BLUE HEAT SCOPE 3-12MM

## (undated) DEVICE — SEAL UNIVERSAL 5MM-8MM XI

## (undated) DEVICE — OBTURATOR BLADELESS 8MM XI CLR

## (undated) DEVICE — BLADE SURG CARBON STEEL #10

## (undated) DEVICE — SEALER VESSEL EXTEND

## (undated) DEVICE — SCISSOR HOT SHEARS XI

## (undated) DEVICE — SOL NACL IRR 3000ML

## (undated) DEVICE — OCCLUDER COLPO-PNEUMO STERILE

## (undated) DEVICE — SUT VICRYL 0 CT-2 27 DYE

## (undated) DEVICE — GOWN POLY REINF BRTH SLV XL